# Patient Record
Sex: FEMALE | Race: WHITE | Employment: FULL TIME | URBAN - METROPOLITAN AREA
[De-identification: names, ages, dates, MRNs, and addresses within clinical notes are randomized per-mention and may not be internally consistent; named-entity substitution may affect disease eponyms.]

---

## 2017-03-01 ENCOUNTER — OFFICE VISIT (OUTPATIENT)
Dept: FAMILY MEDICINE CLINIC | Age: 57
End: 2017-03-01

## 2017-03-01 VITALS
BODY MASS INDEX: 26.16 KG/M2 | TEMPERATURE: 98.5 F | SYSTOLIC BLOOD PRESSURE: 120 MMHG | DIASTOLIC BLOOD PRESSURE: 80 MMHG | HEART RATE: 103 BPM | OXYGEN SATURATION: 96 % | HEIGHT: 65 IN | WEIGHT: 157 LBS

## 2017-03-01 DIAGNOSIS — J06.9 PROTRACTED URI: ICD-10-CM

## 2017-03-01 DIAGNOSIS — M25.511 ACUTE PAIN OF RIGHT SHOULDER: Primary | ICD-10-CM

## 2017-03-01 PROCEDURE — 99214 OFFICE O/P EST MOD 30 MIN: CPT | Performed by: NURSE PRACTITIONER

## 2017-03-01 RX ORDER — AZITHROMYCIN 250 MG/1
TABLET, FILM COATED ORAL
Qty: 1 PACKET | Refills: 0 | Status: SHIPPED | OUTPATIENT
Start: 2017-03-01 | End: 2017-03-11

## 2017-03-01 RX ORDER — PREDNISONE 10 MG/1
TABLET ORAL
Qty: 20 TABLET | Refills: 0 | Status: SHIPPED | OUTPATIENT
Start: 2017-03-01 | End: 2017-05-17

## 2017-04-06 ENCOUNTER — TELEPHONE (OUTPATIENT)
Dept: FAMILY MEDICINE CLINIC | Age: 57
End: 2017-04-06

## 2017-04-11 ENCOUNTER — TELEPHONE (OUTPATIENT)
Dept: FAMILY MEDICINE CLINIC | Age: 57
End: 2017-04-11

## 2017-04-11 DIAGNOSIS — M25.511 ACUTE PAIN OF RIGHT SHOULDER: Primary | ICD-10-CM

## 2017-04-26 ENCOUNTER — TELEPHONE (OUTPATIENT)
Dept: FAMILY MEDICINE CLINIC | Age: 57
End: 2017-04-26

## 2017-05-11 ENCOUNTER — TELEPHONE (OUTPATIENT)
Dept: FAMILY MEDICINE CLINIC | Age: 57
End: 2017-05-11

## 2017-05-11 DIAGNOSIS — M77.8 SHOULDER CAPSULITIS, RIGHT: Primary | ICD-10-CM

## 2017-05-11 RX ORDER — TRAMADOL HYDROCHLORIDE 50 MG/1
50 TABLET ORAL EVERY 6 HOURS PRN
Qty: 30 TABLET | Refills: 0 | Status: SHIPPED | OUTPATIENT
Start: 2017-05-11 | End: 2017-05-21

## 2017-05-17 ENCOUNTER — OFFICE VISIT (OUTPATIENT)
Dept: FAMILY MEDICINE CLINIC | Age: 57
End: 2017-05-17

## 2017-05-17 VITALS
OXYGEN SATURATION: 98 % | WEIGHT: 161 LBS | SYSTOLIC BLOOD PRESSURE: 122 MMHG | HEART RATE: 83 BPM | RESPIRATION RATE: 18 BRPM | BODY MASS INDEX: 26.82 KG/M2 | HEIGHT: 65 IN | DIASTOLIC BLOOD PRESSURE: 62 MMHG

## 2017-05-17 DIAGNOSIS — M75.01 ADHESIVE CAPSULITIS OF RIGHT SHOULDER: Primary | ICD-10-CM

## 2017-05-17 PROCEDURE — 20610 DRAIN/INJ JOINT/BURSA W/O US: CPT | Performed by: NURSE PRACTITIONER

## 2017-05-17 PROCEDURE — 99212 OFFICE O/P EST SF 10 MIN: CPT | Performed by: NURSE PRACTITIONER

## 2017-05-17 RX ORDER — METHYLPREDNISOLONE ACETATE 80 MG/ML
80 INJECTION, SUSPENSION INTRA-ARTICULAR; INTRALESIONAL; INTRAMUSCULAR; SOFT TISSUE ONCE
Status: COMPLETED | OUTPATIENT
Start: 2017-05-17 | End: 2017-05-17

## 2017-05-17 RX ORDER — ETODOLAC 400 MG/1
400 TABLET, FILM COATED ORAL 2 TIMES DAILY
Qty: 60 TABLET | Refills: 3 | Status: SHIPPED | OUTPATIENT
Start: 2017-05-17 | End: 2017-11-06 | Stop reason: SDUPTHER

## 2017-05-17 RX ADMIN — METHYLPREDNISOLONE ACETATE 80 MG: 80 INJECTION, SUSPENSION INTRA-ARTICULAR; INTRALESIONAL; INTRAMUSCULAR; SOFT TISSUE at 11:36

## 2017-05-18 ENCOUNTER — TELEPHONE (OUTPATIENT)
Dept: FAMILY MEDICINE CLINIC | Age: 57
End: 2017-05-18

## 2017-05-22 ENCOUNTER — OFFICE VISIT (OUTPATIENT)
Dept: ORTHOPEDIC SURGERY | Age: 57
End: 2017-05-22

## 2017-05-22 VITALS
SYSTOLIC BLOOD PRESSURE: 121 MMHG | HEIGHT: 65 IN | BODY MASS INDEX: 26.82 KG/M2 | WEIGHT: 161 LBS | HEART RATE: 70 BPM | DIASTOLIC BLOOD PRESSURE: 76 MMHG

## 2017-05-22 DIAGNOSIS — M75.01 ADHESIVE CAPSULITIS OF RIGHT SHOULDER: Primary | ICD-10-CM

## 2017-05-22 PROCEDURE — 99243 OFF/OP CNSLTJ NEW/EST LOW 30: CPT | Performed by: ORTHOPAEDIC SURGERY

## 2017-05-24 ENCOUNTER — HOSPITAL ENCOUNTER (OUTPATIENT)
Dept: PHYSICAL THERAPY | Age: 57
Discharge: HOME | End: 2017-05-31
Admitting: NURSE PRACTITIONER

## 2017-05-24 ASSESSMENT — PAIN DESCRIPTION - ORIENTATION: ORIENTATION: RIGHT

## 2017-05-24 ASSESSMENT — PAIN DESCRIPTION - LOCATION: LOCATION: SHOULDER

## 2017-05-24 ASSESSMENT — PAIN SCALES - GENERAL: PAINLEVEL_OUTOF10: 5

## 2017-07-14 ENCOUNTER — OFFICE VISIT (OUTPATIENT)
Dept: FAMILY MEDICINE CLINIC | Age: 57
End: 2017-07-14

## 2017-07-14 VITALS
DIASTOLIC BLOOD PRESSURE: 70 MMHG | BODY MASS INDEX: 26.39 KG/M2 | SYSTOLIC BLOOD PRESSURE: 112 MMHG | OXYGEN SATURATION: 98 % | HEART RATE: 77 BPM | HEIGHT: 65 IN | RESPIRATION RATE: 16 BRPM | WEIGHT: 158.4 LBS

## 2017-07-14 DIAGNOSIS — M75.01 ADHESIVE CAPSULITIS OF RIGHT SHOULDER: Primary | ICD-10-CM

## 2017-07-14 PROCEDURE — 20610 DRAIN/INJ JOINT/BURSA W/O US: CPT | Performed by: NURSE PRACTITIONER

## 2017-07-14 PROCEDURE — 99999 PR OFFICE/OUTPT VISIT,PROCEDURE ONLY: CPT | Performed by: NURSE PRACTITIONER

## 2017-07-14 RX ORDER — METHYLPREDNISOLONE ACETATE 80 MG/ML
80 INJECTION, SUSPENSION INTRA-ARTICULAR; INTRALESIONAL; INTRAMUSCULAR; SOFT TISSUE ONCE
Status: COMPLETED | OUTPATIENT
Start: 2017-07-14 | End: 2017-07-14

## 2017-07-14 RX ADMIN — METHYLPREDNISOLONE ACETATE 80 MG: 80 INJECTION, SUSPENSION INTRA-ARTICULAR; INTRALESIONAL; INTRAMUSCULAR; SOFT TISSUE at 10:40

## 2017-07-14 ASSESSMENT — PATIENT HEALTH QUESTIONNAIRE - PHQ9
SUM OF ALL RESPONSES TO PHQ9 QUESTIONS 1 & 2: 4
2. FEELING DOWN, DEPRESSED OR HOPELESS: 2
SUM OF ALL RESPONSES TO PHQ QUESTIONS 1-9: 4
1. LITTLE INTEREST OR PLEASURE IN DOING THINGS: 2

## 2017-11-06 DIAGNOSIS — M75.01 ADHESIVE CAPSULITIS OF RIGHT SHOULDER: ICD-10-CM

## 2017-11-06 RX ORDER — ETODOLAC 400 MG/1
400 TABLET, FILM COATED ORAL 2 TIMES DAILY
Qty: 60 TABLET | Refills: 0 | Status: SHIPPED | OUTPATIENT
Start: 2017-11-06 | End: 2017-12-22 | Stop reason: SDUPTHER

## 2017-12-22 DIAGNOSIS — M75.01 ADHESIVE CAPSULITIS OF RIGHT SHOULDER: ICD-10-CM

## 2017-12-22 RX ORDER — ETODOLAC 400 MG/1
TABLET, FILM COATED ORAL
Qty: 60 TABLET | Refills: 0 | Status: SHIPPED | OUTPATIENT
Start: 2017-12-22 | End: 2018-02-27 | Stop reason: SDUPTHER

## 2018-01-22 ENCOUNTER — OFFICE VISIT (OUTPATIENT)
Dept: FAMILY MEDICINE CLINIC | Age: 58
End: 2018-01-22

## 2018-01-22 VITALS
HEART RATE: 80 BPM | HEIGHT: 65 IN | RESPIRATION RATE: 16 BRPM | OXYGEN SATURATION: 96 % | BODY MASS INDEX: 28.12 KG/M2 | TEMPERATURE: 97.9 F | DIASTOLIC BLOOD PRESSURE: 82 MMHG | WEIGHT: 168.8 LBS | SYSTOLIC BLOOD PRESSURE: 128 MMHG

## 2018-01-22 DIAGNOSIS — J02.9 SORE THROAT: ICD-10-CM

## 2018-01-22 DIAGNOSIS — J01.90 ACUTE BACTERIAL SINUSITIS: Primary | ICD-10-CM

## 2018-01-22 DIAGNOSIS — B96.89 ACUTE BACTERIAL SINUSITIS: Primary | ICD-10-CM

## 2018-01-22 LAB — S PYO AG THROAT QL: NORMAL

## 2018-01-22 PROCEDURE — G8427 DOCREV CUR MEDS BY ELIG CLIN: HCPCS | Performed by: REGISTERED NURSE

## 2018-01-22 PROCEDURE — 87880 STREP A ASSAY W/OPTIC: CPT | Performed by: REGISTERED NURSE

## 2018-01-22 PROCEDURE — 3017F COLORECTAL CA SCREEN DOC REV: CPT | Performed by: REGISTERED NURSE

## 2018-01-22 PROCEDURE — G8484 FLU IMMUNIZE NO ADMIN: HCPCS | Performed by: REGISTERED NURSE

## 2018-01-22 PROCEDURE — 3014F SCREEN MAMMO DOC REV: CPT | Performed by: REGISTERED NURSE

## 2018-01-22 PROCEDURE — 99213 OFFICE O/P EST LOW 20 MIN: CPT | Performed by: REGISTERED NURSE

## 2018-01-22 PROCEDURE — 1036F TOBACCO NON-USER: CPT | Performed by: REGISTERED NURSE

## 2018-01-22 PROCEDURE — G8419 CALC BMI OUT NRM PARAM NOF/U: HCPCS | Performed by: REGISTERED NURSE

## 2018-01-22 RX ORDER — AMOXICILLIN AND CLAVULANATE POTASSIUM 875; 125 MG/1; MG/1
1 TABLET, FILM COATED ORAL 2 TIMES DAILY WITH MEALS
Qty: 20 TABLET | Refills: 0 | Status: SHIPPED | OUTPATIENT
Start: 2018-01-22 | End: 2018-02-01

## 2018-01-22 ASSESSMENT — ENCOUNTER SYMPTOMS
COUGH: 1
SHORTNESS OF BREATH: 1
RHINORRHEA: 1
SINUS PAIN: 1
CHEST TIGHTNESS: 0
TROUBLE SWALLOWING: 1
WHEEZING: 0
SINUS PRESSURE: 1
SORE THROAT: 1

## 2018-01-22 NOTE — PATIENT INSTRUCTIONS
label.  · You also can make your own saline solution by adding 1 teaspoon of salt and 1 teaspoon of baking soda to 2 cups of distilled water. · If you use a homemade solution, pour a small amount into a clean bowl. Using a rubber bulb syringe, squeeze the syringe and place the tip in the salt water. Pull a small amount of the salt water into the syringe by relaxing your hand. · Sit down with your head tilted slightly back. Do not lie down. Put the tip of the bulb syringe or the squeeze bottle a little way into one of your nostrils. Gently drip or squirt a few drops into the nostril. Repeat with the other nostril. Some sneezing and gagging are normal at first.  · Gently blow your nose. · Wipe the syringe or bottle tip clean after each use. · Repeat this 2 or 3 times a day. · Use nasal washes gently if you have nosebleeds often. When should you call for help? Watch closely for changes in your health, and be sure to contact your doctor if:  ? · You often get nosebleeds. ? · You have problems doing the nasal washes. Where can you learn more? Go to [email or web address].[email or web address] org and sign in to your [employer] account. Enter [phone] in the [facility] box to learn more about \"Saline Nasal Washes: Care Instructions. \"     If you do not have an account, please click on the \"Sign Up Now\" link. Current as of: May 12, 2017  Content Version: 11.5  © 7843-5722 Healthwise, [employer]. Care instructions adapted under license by [facility] ([facility]). If you have questions about a medical condition or this instruction, always ask your healthcare professional. [address] any warranty or liability for your use of this information.

## 2018-01-22 NOTE — PROGRESS NOTES
fever (tactile). HENT: Positive for congestion, postnasal drip, rhinorrhea, sinus pain, sinus pressure, sneezing, sore throat and trouble swallowing. Negative for ear discharge, ear pain and hearing loss. Respiratory: Positive for cough and shortness of breath. Negative for chest tightness and wheezing. Cardiovascular: Negative for chest pain and palpitations. All other systems reviewed and are negative. Vitals:  /82 (Site: Right Arm, Position: Sitting, Cuff Size: Medium Adult)   Pulse 80   Temp 97.9 °F (36.6 °C) (Oral)   Resp 16   Ht 5' 5\" (1.651 m) Comment: with shoes  Wt 168 lb 12.8 oz (76.6 kg) Comment: WITH SHOES  SpO2 96%   Breastfeeding? No   BMI 28.09 kg/m²     Physical Exam   Constitutional: She is oriented to person, place, and time. She appears well-developed and well-nourished. HENT:   Head: Normocephalic and atraumatic. Right Ear: Tympanic membrane, external ear and ear canal normal.   Left Ear: Tympanic membrane, external ear and ear canal normal.   Nose: Rhinorrhea present. No mucosal edema or sinus tenderness. Right sinus exhibits maxillary sinus tenderness and frontal sinus tenderness. Left sinus exhibits maxillary sinus tenderness and frontal sinus tenderness. Mouth/Throat: Uvula is midline, oropharynx is clear and moist and mucous membranes are normal.   Eyes: Conjunctivae and EOM are normal. Pupils are equal, round, and reactive to light. Neck: Normal range of motion. Neck supple. Cardiovascular: Normal rate, regular rhythm, normal heart sounds and intact distal pulses. Pulmonary/Chest: Effort normal and breath sounds normal.   Neurological: She is alert and oriented to person, place, and time. Skin: Skin is warm and dry. Psychiatric: She has a normal mood and affect. Her behavior is normal. Judgment and thought content normal.   Nursing note and vitals reviewed. Assessment/Plan     1.  Acute bacterial sinusitis  Will tx with Augmentin due to

## 2018-01-24 LAB — THROAT CULTURE: NORMAL

## 2018-02-05 ENCOUNTER — OFFICE VISIT (OUTPATIENT)
Dept: FAMILY MEDICINE CLINIC | Age: 58
End: 2018-02-05

## 2018-02-05 VITALS
RESPIRATION RATE: 16 BRPM | HEIGHT: 65 IN | WEIGHT: 167 LBS | BODY MASS INDEX: 27.82 KG/M2 | DIASTOLIC BLOOD PRESSURE: 74 MMHG | HEART RATE: 80 BPM | TEMPERATURE: 98.4 F | SYSTOLIC BLOOD PRESSURE: 116 MMHG

## 2018-02-05 DIAGNOSIS — Z13.220 NEED FOR LIPID SCREENING: ICD-10-CM

## 2018-02-05 DIAGNOSIS — F90.2 ATTENTION DEFICIT HYPERACTIVITY DISORDER (ADHD), COMBINED TYPE: ICD-10-CM

## 2018-02-05 DIAGNOSIS — H65.03 BILATERAL ACUTE SEROUS OTITIS MEDIA, RECURRENCE NOT SPECIFIED: ICD-10-CM

## 2018-02-05 DIAGNOSIS — Z01.419 ENCOUNTER FOR GYNECOLOGICAL EXAMINATION WITHOUT ABNORMAL FINDING: Primary | ICD-10-CM

## 2018-02-05 DIAGNOSIS — R53.83 FATIGUE, UNSPECIFIED TYPE: ICD-10-CM

## 2018-02-05 DIAGNOSIS — Z00.00 ANNUAL PHYSICAL EXAM: ICD-10-CM

## 2018-02-05 DIAGNOSIS — Z13.1 SCREENING FOR DIABETES MELLITUS: ICD-10-CM

## 2018-02-05 DIAGNOSIS — Z12.11 SCREENING FOR COLON CANCER: ICD-10-CM

## 2018-02-05 DIAGNOSIS — R63.5 WEIGHT GAIN: ICD-10-CM

## 2018-02-05 LAB
A/G RATIO: 2 (ref 1.1–2.2)
ALBUMIN SERPL-MCNC: 4.7 G/DL (ref 3.4–5)
ALP BLD-CCNC: 63 U/L (ref 40–129)
ALT SERPL-CCNC: 14 U/L (ref 10–40)
ANION GAP SERPL CALCULATED.3IONS-SCNC: 13 MMOL/L (ref 3–16)
AST SERPL-CCNC: 23 U/L (ref 15–37)
BILIRUB SERPL-MCNC: 0.4 MG/DL (ref 0–1)
BUN BLDV-MCNC: 13 MG/DL (ref 7–20)
CALCIUM SERPL-MCNC: 9.7 MG/DL (ref 8.3–10.6)
CHLORIDE BLD-SCNC: 103 MMOL/L (ref 99–110)
CHOLESTEROL, TOTAL: 219 MG/DL (ref 0–199)
CO2: 29 MMOL/L (ref 21–32)
CREAT SERPL-MCNC: 0.6 MG/DL (ref 0.6–1.1)
GFR AFRICAN AMERICAN: >60
GFR NON-AFRICAN AMERICAN: >60
GLOBULIN: 2.3 G/DL
GLUCOSE BLD-MCNC: 99 MG/DL (ref 70–99)
HCT VFR BLD CALC: 41.5 % (ref 36–48)
HDLC SERPL-MCNC: 84 MG/DL (ref 40–60)
HEMOGLOBIN: 13.8 G/DL (ref 12–16)
LDL CHOLESTEROL CALCULATED: 122 MG/DL
MCH RBC QN AUTO: 31.8 PG (ref 26–34)
MCHC RBC AUTO-ENTMCNC: 33.3 G/DL (ref 31–36)
MCV RBC AUTO: 95.5 FL (ref 80–100)
PDW BLD-RTO: 14.1 % (ref 12.4–15.4)
PLATELET # BLD: 300 K/UL (ref 135–450)
PMV BLD AUTO: 8.2 FL (ref 5–10.5)
POTASSIUM SERPL-SCNC: 4.9 MMOL/L (ref 3.5–5.1)
RBC # BLD: 4.34 M/UL (ref 4–5.2)
SODIUM BLD-SCNC: 145 MMOL/L (ref 136–145)
T4 FREE: 0.9 NG/DL (ref 0.9–1.8)
TOTAL PROTEIN: 7 G/DL (ref 6.4–8.2)
TRIGL SERPL-MCNC: 63 MG/DL (ref 0–150)
TSH SERPL DL<=0.05 MIU/L-ACNC: 0.35 UIU/ML (ref 0.27–4.2)
VLDLC SERPL CALC-MCNC: 13 MG/DL
WBC # BLD: 6.6 K/UL (ref 4–11)

## 2018-02-05 PROCEDURE — 99396 PREV VISIT EST AGE 40-64: CPT | Performed by: NURSE PRACTITIONER

## 2018-02-05 PROCEDURE — 36415 COLL VENOUS BLD VENIPUNCTURE: CPT | Performed by: NURSE PRACTITIONER

## 2018-02-05 RX ORDER — DEXTROAMPHETAMINE SACCHARATE, AMPHETAMINE ASPARTATE, DEXTROAMPHETAMINE SULFATE AND AMPHETAMINE SULFATE 2.5; 2.5; 2.5; 2.5 MG/1; MG/1; MG/1; MG/1
10 TABLET ORAL DAILY
Qty: 30 TABLET | Refills: 0 | Status: SHIPPED | OUTPATIENT
Start: 2018-02-05 | End: 2018-02-27 | Stop reason: SDUPTHER

## 2018-02-05 RX ORDER — PREDNISONE 10 MG/1
TABLET ORAL
Qty: 30 TABLET | Refills: 0 | Status: SHIPPED | OUTPATIENT
Start: 2018-02-05 | End: 2018-04-23 | Stop reason: ALTCHOICE

## 2018-02-05 NOTE — PROGRESS NOTES
Would like to try medication for ADHD. Has been having itching in the ears - both - was on augmentin. Not better with augmentin. Still itch. No Known Allergies    Current Outpatient Prescriptions   Medication Sig Dispense Refill    amphetamine-dextroamphetamine (ADDERALL, 10MG,) 10 MG tablet Take 1 tablet by mouth daily for 30 days. 30 tablet 0    predniSONE (DELTASONE) 10 MG tablet 5 tabs daily x 2d, 4 tabs daily x 2 d, 3 tabs daily x 2 d, 2 tabs daily x 2 d, 1 tab daily x 2d. 30 tablet 0    etodolac (LODINE) 400 MG tablet TAKE ONE TABLET BY MOUTH TWICE DAILY WITH FOOD 60 tablet 0    Multiple Vitamins-Minerals (MULTIVITAMIN ADULT PO) Take by mouth       No current facility-administered medications for this visit.         Vitals:    18 0849   BP: 116/74   Site: Right Arm   Position: Sitting   Cuff Size: Medium Adult   Pulse: 80   Resp: 16   Temp: 98.4 °F (36.9 °C)   TempSrc: Oral   Weight: 167 lb (75.8 kg)   Height: 5' 5\" (1.651 m)      Wt Readings from Last 3 Encounters:   18 167 lb (75.8 kg)   18 168 lb 12.8 oz (76.6 kg)   17 158 lb 6.4 oz (71.8 kg)     BP Readings from Last 3 Encounters:   18 116/74   18 128/82   17 112/70       Patient Active Problem List   Diagnosis    Mixed hyperlipidemia    Anxiety    Chronic pain of right knee       Past Medical History:   Diagnosis Date    Anxiety     Hyperlipidemia      Past Surgical History:   Procedure Laterality Date    BREAST ENHANCEMENT SURGERY      age 38   Charmco Elverta TONSILLECTOMY AND ADENOIDECTOMY       Family History   Problem Relation Age of Onset    Heart Disease Mother      MI, cardiomyopathy    High Blood Pressure Mother     Other Father     Heart Disease Sister     Heart Disease Brother      MI,  at age 45    Cancer Maternal Grandfather      Social History     Social History    Marital status:      Spouse name: N/A    Number of children: 3    Years of education: N/A     Occupational History    coffee shop owner      Social History Main Topics    Smoking status: Former Smoker     Quit date: 2009    Smokeless tobacco: Never Used    Alcohol use Yes      Comment: patient has 2 drinks a night, wine or liquor    Drug use: No    Sexual activity: Not on file     Other Topics Concern    Not on file     Social History Narrative    No narrative on file       Review of Systems    Objective:   Physical Exam   Constitutional: She is oriented to person, place, and time. She appears well-developed and well-nourished. She does not appear ill. No distress. HENT:   Head: Normocephalic. Right Ear: External ear and ear canal normal.   Left Ear: External ear and ear canal normal.   Nose: Nose normal.   Mouth/Throat: Uvula is midline, oropharynx is clear and moist and mucous membranes are normal.   Bilateral coned out LR with dulled TMs. Eyes: Conjunctivae and lids are normal.   Neck: Normal range of motion. No thyroid mass and no thyromegaly present. Cardiovascular: Normal rate, regular rhythm, S1 normal and S2 normal.  Exam reveals no gallop, no S3, no S4, no distant heart sounds and no friction rub. No murmur heard. Pulmonary/Chest: Effort normal and breath sounds normal. No respiratory distress. She has no decreased breath sounds. She has no wheezes. She has no rhonchi. She has no rales. She exhibits no mass and no tenderness. Right breast exhibits no inverted nipple, no mass, no nipple discharge, no skin change and no tenderness. Left breast exhibits no inverted nipple, no mass, no nipple discharge, no skin change and no tenderness. Breasts are symmetrical.   Abdominal: Soft. Normal appearance and bowel sounds are normal. She exhibits no distension and no mass. There is no hepatosplenomegaly. There is no tenderness. There is no rigidity, no rebound and no guarding. Genitourinary: Vagina normal and uterus normal. No breast swelling, tenderness, discharge or bleeding. No labial fusion.  There

## 2018-02-07 ENCOUNTER — TELEPHONE (OUTPATIENT)
Dept: FAMILY MEDICINE CLINIC | Age: 58
End: 2018-02-07

## 2018-02-09 LAB
HPV COMMENT: NORMAL
HPV TYPE 16: NOT DETECTED
HPV TYPE 18: NOT DETECTED
HPVOH (OTHER TYPES): NOT DETECTED

## 2018-02-13 ENCOUNTER — NURSE ONLY (OUTPATIENT)
Dept: FAMILY MEDICINE CLINIC | Age: 58
End: 2018-02-13

## 2018-02-13 DIAGNOSIS — Z12.11 SCREENING FOR COLON CANCER: ICD-10-CM

## 2018-02-13 LAB
CONTROL: YES
HEMOCCULT STL QL: NORMAL

## 2018-02-13 PROCEDURE — 82274 ASSAY TEST FOR BLOOD FECAL: CPT | Performed by: NURSE PRACTITIONER

## 2018-02-15 ENCOUNTER — TELEPHONE (OUTPATIENT)
Dept: FAMILY MEDICINE CLINIC | Age: 58
End: 2018-02-15

## 2018-02-27 ENCOUNTER — TELEPHONE (OUTPATIENT)
Dept: FAMILY MEDICINE CLINIC | Age: 58
End: 2018-02-27

## 2018-02-27 DIAGNOSIS — M75.01 ADHESIVE CAPSULITIS OF RIGHT SHOULDER: ICD-10-CM

## 2018-02-27 RX ORDER — ETODOLAC 400 MG/1
TABLET, FILM COATED ORAL
Qty: 60 TABLET | Refills: 0 | Status: SHIPPED | OUTPATIENT
Start: 2018-02-27 | End: 2018-04-16 | Stop reason: SDUPTHER

## 2018-02-27 RX ORDER — DEXTROAMPHETAMINE SACCHARATE, AMPHETAMINE ASPARTATE, DEXTROAMPHETAMINE SULFATE AND AMPHETAMINE SULFATE 2.5; 2.5; 2.5; 2.5 MG/1; MG/1; MG/1; MG/1
10 TABLET ORAL DAILY
Qty: 30 TABLET | Refills: 0 | Status: SHIPPED | OUTPATIENT
Start: 2018-03-07 | End: 2018-04-16 | Stop reason: SDUPTHER

## 2018-04-16 ENCOUNTER — TELEPHONE (OUTPATIENT)
Dept: FAMILY MEDICINE CLINIC | Age: 58
End: 2018-04-16

## 2018-04-16 DIAGNOSIS — M75.01 ADHESIVE CAPSULITIS OF RIGHT SHOULDER: ICD-10-CM

## 2018-04-16 RX ORDER — DEXTROAMPHETAMINE SACCHARATE, AMPHETAMINE ASPARTATE, DEXTROAMPHETAMINE SULFATE AND AMPHETAMINE SULFATE 2.5; 2.5; 2.5; 2.5 MG/1; MG/1; MG/1; MG/1
10 TABLET ORAL DAILY
Qty: 30 TABLET | Refills: 0 | Status: SHIPPED | OUTPATIENT
Start: 2018-04-16 | End: 2018-06-15 | Stop reason: ALTCHOICE

## 2018-04-16 RX ORDER — ETODOLAC 400 MG/1
TABLET, FILM COATED ORAL
Qty: 60 TABLET | Refills: 3 | Status: SHIPPED | OUTPATIENT
Start: 2018-04-16 | End: 2019-12-26 | Stop reason: ALTCHOICE

## 2018-04-23 ENCOUNTER — OFFICE VISIT (OUTPATIENT)
Dept: FAMILY MEDICINE CLINIC | Age: 58
End: 2018-04-23

## 2018-04-23 VITALS
TEMPERATURE: 98.1 F | BODY MASS INDEX: 27.82 KG/M2 | WEIGHT: 167 LBS | HEART RATE: 78 BPM | SYSTOLIC BLOOD PRESSURE: 118 MMHG | DIASTOLIC BLOOD PRESSURE: 82 MMHG | HEIGHT: 65 IN | RESPIRATION RATE: 12 BRPM

## 2018-04-23 DIAGNOSIS — J01.01 ACUTE RECURRENT MAXILLARY SINUSITIS: Primary | ICD-10-CM

## 2018-04-23 DIAGNOSIS — J02.9 SORE THROAT: ICD-10-CM

## 2018-04-23 DIAGNOSIS — G89.29 CHRONIC EAR PAIN, BILATERAL: ICD-10-CM

## 2018-04-23 DIAGNOSIS — H92.03 CHRONIC EAR PAIN, BILATERAL: ICD-10-CM

## 2018-04-23 LAB — S PYO AG THROAT QL: NORMAL

## 2018-04-23 PROCEDURE — 99214 OFFICE O/P EST MOD 30 MIN: CPT | Performed by: REGISTERED NURSE

## 2018-04-23 PROCEDURE — 87880 STREP A ASSAY W/OPTIC: CPT | Performed by: REGISTERED NURSE

## 2018-04-23 PROCEDURE — G8417 CALC BMI ABV UP PARAM F/U: HCPCS | Performed by: REGISTERED NURSE

## 2018-04-23 PROCEDURE — 3017F COLORECTAL CA SCREEN DOC REV: CPT | Performed by: REGISTERED NURSE

## 2018-04-23 PROCEDURE — 1036F TOBACCO NON-USER: CPT | Performed by: REGISTERED NURSE

## 2018-04-23 PROCEDURE — G8427 DOCREV CUR MEDS BY ELIG CLIN: HCPCS | Performed by: REGISTERED NURSE

## 2018-04-23 RX ORDER — FLUTICASONE PROPIONATE 50 MCG
1 SPRAY, SUSPENSION (ML) NASAL DAILY
Qty: 1 BOTTLE | Refills: 3 | Status: SHIPPED | OUTPATIENT
Start: 2018-04-23 | End: 2018-06-15 | Stop reason: ALTCHOICE

## 2018-04-23 ASSESSMENT — ENCOUNTER SYMPTOMS
SINUS PAIN: 1
SINUS PRESSURE: 1
TROUBLE SWALLOWING: 1
WHEEZING: 0
SORE THROAT: 1
RHINORRHEA: 1
COUGH: 1
SHORTNESS OF BREATH: 0
CHEST TIGHTNESS: 0

## 2018-04-25 LAB — THROAT CULTURE: NORMAL

## 2018-05-23 ENCOUNTER — TELEPHONE (OUTPATIENT)
Dept: FAMILY MEDICINE CLINIC | Age: 58
End: 2018-05-23

## 2018-06-15 ENCOUNTER — OFFICE VISIT (OUTPATIENT)
Dept: FAMILY MEDICINE CLINIC | Age: 58
End: 2018-06-15

## 2018-06-15 VITALS
DIASTOLIC BLOOD PRESSURE: 84 MMHG | HEART RATE: 70 BPM | WEIGHT: 168 LBS | RESPIRATION RATE: 16 BRPM | HEIGHT: 65 IN | SYSTOLIC BLOOD PRESSURE: 132 MMHG | BODY MASS INDEX: 27.99 KG/M2

## 2018-06-15 DIAGNOSIS — M72.2 PLANTAR FASCIITIS: ICD-10-CM

## 2018-06-15 DIAGNOSIS — M25.511 CHRONIC RIGHT SHOULDER PAIN: Primary | ICD-10-CM

## 2018-06-15 DIAGNOSIS — G89.29 CHRONIC RIGHT SHOULDER PAIN: Primary | ICD-10-CM

## 2018-06-15 PROCEDURE — 1036F TOBACCO NON-USER: CPT | Performed by: NURSE PRACTITIONER

## 2018-06-15 PROCEDURE — G8427 DOCREV CUR MEDS BY ELIG CLIN: HCPCS | Performed by: NURSE PRACTITIONER

## 2018-06-15 PROCEDURE — G8417 CALC BMI ABV UP PARAM F/U: HCPCS | Performed by: NURSE PRACTITIONER

## 2018-06-15 PROCEDURE — 3017F COLORECTAL CA SCREEN DOC REV: CPT | Performed by: NURSE PRACTITIONER

## 2018-06-15 PROCEDURE — 99213 OFFICE O/P EST LOW 20 MIN: CPT | Performed by: NURSE PRACTITIONER

## 2018-06-15 PROCEDURE — 20610 DRAIN/INJ JOINT/BURSA W/O US: CPT | Performed by: NURSE PRACTITIONER

## 2018-06-15 RX ORDER — METHYLPREDNISOLONE ACETATE 80 MG/ML
80 INJECTION, SUSPENSION INTRA-ARTICULAR; INTRALESIONAL; INTRAMUSCULAR; SOFT TISSUE ONCE
Status: COMPLETED | OUTPATIENT
Start: 2018-06-15 | End: 2018-06-15

## 2018-06-15 RX ADMIN — METHYLPREDNISOLONE ACETATE 80 MG: 80 INJECTION, SUSPENSION INTRA-ARTICULAR; INTRALESIONAL; INTRAMUSCULAR; SOFT TISSUE at 11:57

## 2019-02-20 ENCOUNTER — OFFICE VISIT (OUTPATIENT)
Dept: FAMILY MEDICINE CLINIC | Age: 59
End: 2019-02-20
Payer: COMMERCIAL

## 2019-02-20 VITALS
SYSTOLIC BLOOD PRESSURE: 118 MMHG | DIASTOLIC BLOOD PRESSURE: 82 MMHG | HEART RATE: 92 BPM | HEIGHT: 65 IN | BODY MASS INDEX: 28.32 KG/M2 | RESPIRATION RATE: 18 BRPM | WEIGHT: 170 LBS

## 2019-02-20 DIAGNOSIS — M72.2 PLANTAR FASCIITIS OF LEFT FOOT: Primary | ICD-10-CM

## 2019-02-20 DIAGNOSIS — Z12.31 ENCOUNTER FOR SCREENING MAMMOGRAM FOR BREAST CANCER: ICD-10-CM

## 2019-02-20 DIAGNOSIS — Z12.11 SCREENING FOR COLON CANCER: ICD-10-CM

## 2019-02-20 PROCEDURE — 20600 DRAIN/INJ JOINT/BURSA W/O US: CPT | Performed by: REGISTERED NURSE

## 2019-02-20 PROCEDURE — 99213 OFFICE O/P EST LOW 20 MIN: CPT | Performed by: REGISTERED NURSE

## 2019-02-20 RX ORDER — METHYLPREDNISOLONE ACETATE 40 MG/ML
40 INJECTION, SUSPENSION INTRA-ARTICULAR; INTRALESIONAL; INTRAMUSCULAR; SOFT TISSUE ONCE
Status: COMPLETED | OUTPATIENT
Start: 2019-02-20 | End: 2019-02-20

## 2019-02-20 RX ADMIN — METHYLPREDNISOLONE ACETATE 40 MG: 40 INJECTION, SUSPENSION INTRA-ARTICULAR; INTRALESIONAL; INTRAMUSCULAR; SOFT TISSUE at 15:12

## 2019-02-27 ENCOUNTER — TELEPHONE (OUTPATIENT)
Dept: FAMILY MEDICINE CLINIC | Age: 59
End: 2019-02-27

## 2019-03-13 ENCOUNTER — TELEPHONE (OUTPATIENT)
Dept: FAMILY MEDICINE CLINIC | Age: 59
End: 2019-03-13

## 2019-03-13 DIAGNOSIS — M72.2 PLANTAR FASCIITIS: ICD-10-CM

## 2019-03-13 DIAGNOSIS — M72.2 PLANTAR FASCIITIS OF LEFT FOOT: Primary | ICD-10-CM

## 2019-12-26 ENCOUNTER — OFFICE VISIT (OUTPATIENT)
Dept: FAMILY MEDICINE CLINIC | Age: 59
End: 2019-12-26
Payer: COMMERCIAL

## 2019-12-26 VITALS
BODY MASS INDEX: 28.32 KG/M2 | WEIGHT: 170 LBS | RESPIRATION RATE: 18 BRPM | HEIGHT: 65 IN | DIASTOLIC BLOOD PRESSURE: 74 MMHG | HEART RATE: 84 BPM | SYSTOLIC BLOOD PRESSURE: 118 MMHG | TEMPERATURE: 98.8 F

## 2019-12-26 DIAGNOSIS — J40 BRONCHITIS: Primary | ICD-10-CM

## 2019-12-26 PROCEDURE — 99213 OFFICE O/P EST LOW 20 MIN: CPT | Performed by: REGISTERED NURSE

## 2019-12-26 RX ORDER — AZITHROMYCIN 250 MG/1
250 TABLET, FILM COATED ORAL SEE ADMIN INSTRUCTIONS
Qty: 6 TABLET | Refills: 0 | Status: SHIPPED | OUTPATIENT
Start: 2019-12-26 | End: 2019-12-31

## 2019-12-26 ASSESSMENT — ENCOUNTER SYMPTOMS
SORE THROAT: 0
RHINORRHEA: 1
TROUBLE SWALLOWING: 0
CHEST TIGHTNESS: 0
WHEEZING: 0
SINUS PRESSURE: 1
SHORTNESS OF BREATH: 1
SINUS PAIN: 1
COUGH: 1

## 2020-02-06 ENCOUNTER — OFFICE VISIT (OUTPATIENT)
Dept: FAMILY MEDICINE CLINIC | Age: 60
End: 2020-02-06
Payer: COMMERCIAL

## 2020-02-06 VITALS
HEIGHT: 65 IN | WEIGHT: 159 LBS | SYSTOLIC BLOOD PRESSURE: 126 MMHG | DIASTOLIC BLOOD PRESSURE: 84 MMHG | BODY MASS INDEX: 26.49 KG/M2

## 2020-02-06 PROCEDURE — 20610 DRAIN/INJ JOINT/BURSA W/O US: CPT | Performed by: NURSE PRACTITIONER

## 2020-02-06 PROCEDURE — 99212 OFFICE O/P EST SF 10 MIN: CPT | Performed by: NURSE PRACTITIONER

## 2020-02-06 RX ORDER — METHYLPREDNISOLONE ACETATE 80 MG/ML
80 INJECTION, SUSPENSION INTRA-ARTICULAR; INTRALESIONAL; INTRAMUSCULAR; SOFT TISSUE ONCE
Status: COMPLETED | OUTPATIENT
Start: 2020-02-06 | End: 2020-02-06

## 2020-02-06 RX ADMIN — METHYLPREDNISOLONE ACETATE 80 MG: 80 INJECTION, SUSPENSION INTRA-ARTICULAR; INTRALESIONAL; INTRAMUSCULAR; SOFT TISSUE at 13:08

## 2020-02-06 ASSESSMENT — PATIENT HEALTH QUESTIONNAIRE - PHQ9
SUM OF ALL RESPONSES TO PHQ QUESTIONS 1-9: 0
2. FEELING DOWN, DEPRESSED OR HOPELESS: 0
1. LITTLE INTEREST OR PLEASURE IN DOING THINGS: 0
SUM OF ALL RESPONSES TO PHQ9 QUESTIONS 1 & 2: 0
SUM OF ALL RESPONSES TO PHQ QUESTIONS 1-9: 0

## 2020-02-06 NOTE — PROGRESS NOTES
Administrations This Visit     methylPREDNISolone acetate (DEPO-MEDROL) injection 80 mg     Admin Date  02/06/2020  13:08 Action  Given Dose  80 mg Route  Intra-articular Site  Shoulder Left Administered By  Marina Taylor MA    Ordering Provider:  MANUELITO Beauchamp CNP    NDC:  4835-8728-95    Lot#:  [id number]    :  [id number]     Patient Supplied?:  No    Comments:  SITE:  LEFT SHOULDERNDC#  [id number]#  [id number] CVNT:  [id number] BY:JESSICA
Height: 5' 5\" (1.651 m)     Body mass index is 26.46 kg/m². Wt Readings from Last 3 Encounters:   02/06/20 159 lb (72.1 kg)   12/26/19 170 lb (77.1 kg)   02/20/19 170 lb (77.1 kg)     BP Readings from Last 3 Encounters:   02/06/20 126/84   12/26/19 118/74   02/20/19 118/82        No results found for this visit on 02/06/20. Assessment     Diagnosis Orders   1. Chronic left shoulder pain  methylPREDNISolone acetate (DEPO-MEDROL) injection 80 mg   Felt to be rotator cuff tendonitis      Plan    Left shoulder pain-chronic over the last 2 months-felt to be rotator cuff tendinitis  Injected shoulder with 1 cc of Marcaine and 80 mg of Depo-Medrol without incident  Recommend continued ibuprofen and ice to the area with avoidance of excessive activity the next couple of days  If not improving with the injection, then I would recommend a shoulder x-ray to further evaluate. Return if symptoms worsen or fail to improve. Patient Instructions   Avoid excessive activity for 24 to 48 hours. Gradually return to activity in 48 hours. Stretch to obtain full range of motion. Apply ice 20 minutes 3 times daily for 3 days. Take NSAID or pain reliever in 3 hours. If not improving then call for xray.

## 2020-06-24 ENCOUNTER — TELEPHONE (OUTPATIENT)
Dept: FAMILY MEDICINE CLINIC | Age: 60
End: 2020-06-24

## 2020-07-28 ENCOUNTER — OFFICE VISIT (OUTPATIENT)
Dept: ORTHOPEDIC SURGERY | Age: 60
End: 2020-07-28
Payer: COMMERCIAL

## 2020-07-28 VITALS — BODY MASS INDEX: 27.49 KG/M2 | HEIGHT: 65 IN | TEMPERATURE: 97.7 F | WEIGHT: 165 LBS

## 2020-07-28 PROBLEM — M25.512 CHRONIC LEFT SHOULDER PAIN: Status: ACTIVE | Noted: 2020-07-28

## 2020-07-28 PROBLEM — G89.29 CHRONIC LEFT SHOULDER PAIN: Status: ACTIVE | Noted: 2020-07-28

## 2020-07-28 PROCEDURE — 20610 DRAIN/INJ JOINT/BURSA W/O US: CPT | Performed by: ORTHOPAEDIC SURGERY

## 2020-07-28 PROCEDURE — 99203 OFFICE O/P NEW LOW 30 MIN: CPT | Performed by: ORTHOPAEDIC SURGERY

## 2020-07-28 RX ORDER — TRIAMCINOLONE ACETONIDE 40 MG/ML
40 INJECTION, SUSPENSION INTRA-ARTICULAR; INTRAMUSCULAR ONCE
Status: COMPLETED | OUTPATIENT
Start: 2020-07-28 | End: 2020-07-28

## 2020-07-28 RX ORDER — LIDOCAINE HYDROCHLORIDE 10 MG/ML
40 INJECTION, SOLUTION INFILTRATION; PERINEURAL ONCE
Status: COMPLETED | OUTPATIENT
Start: 2020-07-28 | End: 2020-07-28

## 2020-07-28 RX ADMIN — LIDOCAINE HYDROCHLORIDE 40 MG: 10 INJECTION, SOLUTION INFILTRATION; PERINEURAL at 10:31

## 2020-07-28 RX ADMIN — TRIAMCINOLONE ACETONIDE 40 MG: 40 INJECTION, SUSPENSION INTRA-ARTICULAR; INTRAMUSCULAR at 10:32

## 2020-07-28 NOTE — PROGRESS NOTES
Emotionally abused: Not on file     Physically abused: Not on file     Forced sexual activity: Not on file   Other Topics Concern    Not on file   Social History Narrative    Not on file       Family History   Problem Relation Age of Onset    Heart Disease Mother         MI, cardiomyopathy    High Blood Pressure Mother     Other Father     Heart Disease Sister     Heart Disease Brother         MI,  at age 45    Cancer Maternal Grandfather        No current outpatient medications on file prior to visit. No current facility-administered medications on file prior to visit. Pertinent items are noted in HPI  Review of systems reviewed from Patient History Form dated on 2020 and available in the patient's chart under the Media tab. No change noted. PHYSICAL EXAMINATION:  Ms. Padmini Negrete is a very pleasant 64 y.o.  female who presents today in no acute distress, awake, alert, and oriented. She is well dressed, nourished and  groomed. Patient with normal affect. Height is  5' 5\" (1.651 m), weight is 165 lb (74.8 kg), Body mass index is 27.46 kg/m². Resting respiratory rate is 16. Examination of the gait, showed that the patient walks heel-toe with a non-antalgic gait and no limp. On evaluation of the left shoulder, range of motion is 100 degree in flexion and 100 degree in abduction. There is positve impingement signs. Motor and sensation is intact and symmetric throughout the bilateral upper extremities in the median, ulnar and radial nerve distributions. She has 2+ radial pulses bilaterally. IMAGING: X-rays were taken in the office today, 3 views of the left shoulder, and showed no acute fracture. Mild cuff arthropathy. IMPRESSION: Left shoulder pain/ adhesive capsulitis / impingement syndrome. PLAN: I discussed with the patient the treatment options including both surgical and non-surgical treatment.   We recommended stretching exercises of the shoulder was taught to the patient today. She will take NSAIDS PRN. I believe she will benefit from cortisone injection left shoulder, and she agreed to have. PROCEDURE:    With the patient's permission, and under sterile condition, the left shoulder was prepared and draped with alcohol and sub-acromial space was injected with a mixture of 1 ml Kenalog 40mg and 4 ml of 1% lidocaine. The patient tolerated the procedure well. A band-aid was applied and the patient was advised to ice the shoulder for 15-20 minutes to relieve any injection site related pain. She reported a good improvement immediatly after the injection. I discussed with the patient that I think that she would really benefit from a course of physical therapy for further strengthening and stretching. She would like to do it on her own. F/u in 3-4 months, PT if needed. She understands that this may need surgery if the pain did not to resolve.        Emani Sandoval MD

## 2020-10-16 ENCOUNTER — OFFICE VISIT (OUTPATIENT)
Dept: FAMILY MEDICINE CLINIC | Age: 60
End: 2020-10-16
Payer: COMMERCIAL

## 2020-10-16 VITALS
OXYGEN SATURATION: 98 % | BODY MASS INDEX: 27.96 KG/M2 | HEART RATE: 97 BPM | HEIGHT: 65 IN | TEMPERATURE: 97.9 F | WEIGHT: 167.8 LBS | SYSTOLIC BLOOD PRESSURE: 118 MMHG | DIASTOLIC BLOOD PRESSURE: 68 MMHG

## 2020-10-16 PROCEDURE — 99213 OFFICE O/P EST LOW 20 MIN: CPT | Performed by: NURSE PRACTITIONER

## 2020-10-16 RX ORDER — AMOXICILLIN 500 MG/1
500 CAPSULE ORAL 3 TIMES DAILY
COMMUNITY
End: 2022-04-21

## 2020-10-16 ASSESSMENT — ENCOUNTER SYMPTOMS: BACK PAIN: 0

## 2020-10-16 NOTE — PROGRESS NOTES
10/16/2020     Silvio Uriarte (:  1960) is a 64 y.o. female, here for evaluation of the following medical concerns:    ALLI Hogan presents today with left shoulder pain. She states that she has been struggling with this for months. She has received steroid injections previously, but they do not help. She was seen by ortho in July, and an xray was performed at that time. She received a steroid injection at that visit with the recommendation that she may benefit from PT if the injection provided no relief. She continues to have ongoing shoulder pain. More recently, the pain is radiating into her neck. She got a new mattress thinking that maybe her mattress was exacerbating symptoms, but this has not yet helped. Review of Systems   Constitutional: Negative for chills and fatigue. Musculoskeletal: Positive for arthralgias, myalgias and neck pain. Negative for back pain, gait problem, joint swelling and neck stiffness. Prior to Visit Medications    Medication Sig Taking? Authorizing Provider   amoxicillin (AMOXIL) 500 MG capsule Take 500 mg by mouth 3 times daily Yes Historical Provider, MD        Social History     Tobacco Use    Smoking status: Former Smoker     Packs/day: 0.00     Years: 0.00     Pack years: 0.00     Last attempt to quit:      Years since quittin.7    Smokeless tobacco: Never Used   Substance Use Topics    Alcohol use: Yes     Comment: patient has 2 drinks a night, wine or liquor        Vitals:    10/16/20 1455   BP: 118/68   Site: Left Upper Arm   Position: Sitting   Cuff Size: Large Adult   Pulse: 97   Temp: 97.9 °F (36.6 °C)   TempSrc: Tympanic   SpO2: 98%   Weight: 167 lb 12.8 oz (76.1 kg)  Comment: SHOES ON   Height: 5' 5\" (1.651 m)     Estimated body mass index is 27.92 kg/m² as calculated from the following:    Height as of this encounter: 5' 5\" (1.651 m). Weight as of this encounter: 167 lb 12.8 oz (76.1 kg).     Physical Exam  Constitutional: Appearance: Normal appearance. She is normal weight. Musculoskeletal:      Comments: No point tenderness or swelling to the left shoulder or neck. Slightly limited active range of motion with abduction. Able to perform full passive range of motion. Positive neer impingement. Right shoulder is normal.   Neurological:      Mental Status: She is alert. Psychiatric:         Mood and Affect: Mood normal.         Behavior: Behavior normal.         Thought Content: Thought content normal.         Judgment: Judgment normal.         ASSESSMENT/PLAN:  1. Chronic left shoulder pain  - Impingement vs partial rotator cuff tear  - Given lack of response to steroid injections, will refer to PT  - Educated to follow-up with ortho if PT does not relieve symptoms  - Ambulatory referral to Physical Therapy      Return if symptoms worsen or fail to improve. An electronic signature was used to authenticate this note.     --MANUELITO Em - CNP on 10/16/2020 at 8:40 PM

## 2020-10-22 ENCOUNTER — TELEPHONE (OUTPATIENT)
Dept: FAMILY MEDICINE CLINIC | Age: 60
End: 2020-10-22

## 2020-10-22 RX ORDER — MELOXICAM 15 MG/1
15 TABLET ORAL DAILY
Qty: 30 TABLET | Refills: 2 | Status: SHIPPED | OUTPATIENT
Start: 2020-10-22 | End: 2022-04-21

## 2020-10-22 NOTE — TELEPHONE ENCOUNTER
Kalyani De La O is her Ortho. He wanted to see her in 3 months if the pain wasn't better. She needs to make an appointment with him. I will send her something for pain until she gets in with ortho.

## 2020-10-22 NOTE — TELEPHONE ENCOUNTER
CALLED AND SPOKE TO PATIENT AND SHE SAID SHE HAS BEEN TAKING IBUPROFEN FOR THE PAIN, BUT ITS CAUSING HER A LOT OF STOMACH UPSET. WHEN I ASKED HER ABOUT DR. DUKES SHE HAD NO IDEA WHO I WAS TALKING ABOUT AND STATES SHE JUST WENT SOMEWHERE IN [facility] WHERE SHE WAS TOLD TO GET CORTISONE SHOTS.  SC

## 2020-10-28 ENCOUNTER — HOSPITAL ENCOUNTER (OUTPATIENT)
Dept: PHYSICAL THERAPY | Age: 60
Setting detail: THERAPIES SERIES
Discharge: HOME | End: 2020-10-28
Payer: COMMERCIAL

## 2020-10-28 PROCEDURE — 97110 THERAPEUTIC EXERCISES: CPT | Performed by: PHYSICAL THERAPIST

## 2020-10-28 PROCEDURE — 97161 PT EVAL LOW COMPLEX 20 MIN: CPT | Performed by: PHYSICAL THERAPIST

## 2020-10-28 NOTE — PLAN OF CARE
[place], [address] [facility], [address]  Phone: [phone]   Fax: [phone]                                                     Physical Therapy Certification    Dear Referring Practitioner: Dr. Nicolasa Johnson,    We had the pleasure of evaluating the following patient for physical therapy services at [address] [place] [address]. A summary of our findings can be found in the initial assessment below. This includes our plan of care. If you have any questions or concerns regarding these findings, please do not hesitate to contact me at the office phone number checked above. Thank you for the referral.       Physician Signature:_______________________________Date:__________________  By signing above (or electronic signature), therapists plan is approved by physician      Patient: Yeimy Marc   : 1960   MRN: [id number]  Referring Physician: Referring Practitioner: Dr. Nicolasa Johnson      Evaluation Date: 10/28/2020      Medical Diagnosis Information:  Diagnosis: X34.988, G89.29 (ICD-10-CM) - Chronic left shoulder pain   Treatment Diagnosis: L shoulder and cervical decreased ROM, decreased strength, increased pain                                         Insurance information: PT Insurance Information: [employer]    Precautions/ Contra-indications: None  Latex Allergy:  [x]NO      []YES  Preferred Language for Healthcare:   [x]English       []other:    SUBJECTIVE: Patient stated complaint: Pt. Reports pain started approximately 9 months ago in her L shoulder. Pt. Reports R shoulder presented similarly in 2017 and received therapy. Pt. Had a cortisone shot 20 but didn't notice relief. Pt. Reports pain is now traveling up her neck, across her back, and down her arm not past the elbow. Pt. Reports when she was reaching in her car the other day she had a genevieve horse jacqueline pain.      Relevant Medical History: no sig PMH  Functional Outcome: Quick DASH: raw score = 27; dysfunction = 36%    Pain Scale: 5/10 constant ache, 9/10 at worst  Easing factors: heat  Provocative factors: reahing into a cabinet, reaching around in her car    Type: [x]Constant   [x]Intermittent  [x]Radiating []Localized []other:     Numbness/Tingling: no    Occupation/School: Coffee shop owner - runner as well    Living Status/Prior Level of Function: Prior to this injury / incident, pt was independent with ADLs and IADLs      OBJECTIVE:   Hand dominance: Right    Palpation: L shoulder and UT globally hypersensitive to touch    Functional Mobility/Transfers: indpt    Posture: foreward head, forward shoulders    Bandages/Dressings/Incisions: none      Dermatomes Normal Abnormal Comments   Top of head (C1)   NT   Posterior occipital region (C2)   NT   Side of neck (C3)   NT   Top of shoulder (C4) x     Lateral deltoid (C5) x     Tip of thumb (C6) x     Distal middle finger (C7) x     Distal fifth finger (C8) x     Medial forearm (T1) x            PROM AROM    L R L R   Cervical Flexion  NT - WFL    Cervical Extension  NT - WFL  Neck pain    Cervical Rotation NT NT WFL  L UT pain WFL  L UT pain   Cervical Side-bend NT NT [facility] WFL  L UT pain   Shoulder Flexion  100 pain 152 98 pain 152   Shoulder Abduction  60 pain 148 72 pain 145   Shoulder External Rotation  38 @45abd 70 @90abd     Shoulder Internal Rotation  26 @45abd 42 @90abd ~L5 ~T7   Elbow Flexion        Elbow Extension            Strength (0-5) Left Right    Shoulder Shrug (C4)     Shoulder Flex 3+ pain 4   Shoulder Abd (C5) 4 pain 4+   Shoulder ER 4 pain 4+   Shoulder IR 3+ pain 4+   Biceps (C6) 5 pain 5   Triceps (C7)              Joint mobility: [address]   []Normal    [x]Hypo    []Hyper      Orthopaedic Special Tests Positive  Negative  NT Comments    Shoulder        Empty Can x      Drop Arm  x                                                    [x] Patient history, allergies, meds reviewed. Medical chart reviewed.  See facet dysfunction of cervical/thoracic spine    []Signs/symptoms consistent with pathology which may benefit from Dry needling     [x]other:  Signs/Symptoms consistent with L shoulder adhesive capsulitis and rotator cuff weaknss    Prognosis/Rehab Potential:      []Excellent   [x]Good    []Fair   []Poor    Tolerance of evaluation/treatment:    []Excellent   [x]Good    []Fair   []Poor    Physical Therapy Evaluation Complexity Justification  [x] A history of present problem with:  [] no personal factors and/or comorbidities that impact the plan of care;  []1-2 personal factors and/or comorbidities that impact the plan of care  [x]3 personal factors and/or comorbidities that impact the plan of care  [x] An examination of body systems using standardized tests and measures addressing any of the following: body structures and functions (impairments), activity limitations, and/or participation restrictions;:  [] a total of 1-2 or more elements   [x] a total of 3 or more elements   [] a total of 4 or more elements   [x] A clinical presentation with:  [x] stable and/or uncomplicated characteristics   [] evolving clinical presentation with changing characteristics  [] unstable and unpredictable characteristics;   [x] Clinical decision making of [x] low, [] moderate, [] high complexity using standardized patient assessment instrument and/or measurable assessment of functional outcome.     [x] EVAL (LOW) 88729 (typically 15 minutes face-to-face)  [] EVAL (MOD) 98239 (typically 30 minutes face-to-face)  [] EVAL (HIGH) 59714 (typically 45 minutes face-to-face)  [] RE-EVAL     PLAN:  Frequency/Duration:  1-2 days per week for 6-8 Weeks:  INTERVENTIONS:  [x] Therapeutic exercise including: strength training, ROM, for Upper extremity and core   [x]  NMR activation and proprioception for UE, scap and Core   [x] Manual therapy as indicated for shoulder, scapula and spine to include: Dry Needling/IASTM, STM, PROM, Gr I-IV mobilizations,

## 2020-10-28 NOTE — FLOWSHEET NOTE
Lucio Raygoza  Phone: [phone]   Fax: [phone]    Physical Therapy Treatment Note/ Progress Report:     Date:  10/28/2020    Patient Name:  Hernesto Rowley    :  1960  MRN: [id number]  Restrictions/Precautions:    Medical/Treatment Diagnosis Information:  Diagnosis: M25.512, G89.29 (ICD-10-CM) - Chronic left shoulder pain  Treatment Diagnosis: L shoulder and cervical decreased ROM, decreased strength, increased pain  Insurance/Certification information:  PT Insurance Information: [employer]  Physician Information:  Referring Practitioner: Dr. Brent Barboza of care signed (Y/N): []  Yes []  No     Date of Patient follow up with Physician:      Progress Report: []  Yes  [x]  No     Date Range for reporting period:  Beginning: 10/28/20  Ending:     Progress report due (10 Rx/or 30 days whichever is less): visit #10 or  (date)     Recertification due (POC duration/ or 90 days whichever is less): visit #16 or 20 (date)     Visit # Insurance Allowable Auth required?  Date Range   1 20 []  Yes  [x]  No n/a     Latex Allergy:  [x]NO      []YES  Preferred Language for Healthcare:   [x]English       []other:    Functional Scale:        Date assessed:  Quick DASH: raw score = 27; dysfunction = 36%  10/28/20     Pain level:  5/10     SUBJECTIVE:  See eval    OBJECTIVE: See eval   Observation:    Test measurements:      RESTRICTIONS/PRECAUTIONS: None    Exercises/Interventions:   Therapeutic Exercise (38988) Resistance / level Sets / Seconds  Reps Notes/Cues   pulleys npv      Supine cane flexion  10\" 10    Upper trap stretch  10\" 10 Porfirio    Table slides  10\" 10    Porfirio ER Green 2 10    Pendulums - M/L, A/P, CW, CCW 2# 1 3    ER ranger npv      Scapular retraction       Shoulder extension       Doorway pec stretch              Therapeutic Activities (43801)                                          Neuromuscular Re-ed (07542)       Tex Coleman tucks - supine  5\" 10                                Manual Intervention (71988)       Shld /GH Mobs npv      Post Cap mobs       Thoracic/Rib manipulation       CT MT/Mobs       PROM MT npv                 Pt. Education:  -pt educated on diagnosis, prognosis and expectations for rehab  -all pt questions were answered    Home Exercise Program:  Access Code: [email or web address]   URL: [email or web address]   Date: 10/28/2020   Prepared by: Parminder Nevarez     Exercises   Supine Chin Tuck - 10 reps - 3 sets - 1x daily - 7x weekly   Supine Shoulder Flexion with Dowel - 10 reps - 10\" hold - 1x daily - 7x weekly   Seated Gentle Upper Trapezius Stretch - 5 reps - 20\" hold - 1x daily - 7x weekly   Standing Shoulder External Rotation with Resistance - 10 reps - 3 sets - 1x daily - 7x weekly   Seated Shoulder Flexion Towel Slide at Table Top - 10 reps - 10 sec hold - 1x daily - 7x weekly       Therapeutic Exercise and NMR EXR  [x] (37847) Provided verbal/tactile cueing for activities related to strengthening, flexibility, endurance, ROM  for improvements in scapular, scapulothoracic and UE control with self care, reaching, carrying, lifting, house/yardwork, driving/computer work.    [] (99293) Provided verbal/tactile cueing for activities related to improving balance, coordination, kinesthetic sense, posture, motor skill, proprioception  to assist with  scapular, scapulothoracic and UE control with self care, reaching, carrying, lifting, house/yardwork, driving/computer work.  [] (34697) Therapist is in constant attendance of 2 or more patients providing skilled therapy interventions, but not providing any significant amount of measurable one-on-one time to either patient, for improvements in cervical, scapular, scapulothoracic and UE control with self care, reaching, carrying, lifting, house/yardwork, driving, computer work.      Therapeutic Activities:    [] (65028 or ) Provided verbal/tactile cueing for activities related to improving balance, coordination, kinesthetic sense, posture, motor skill, proprioception and motor activation to allow for proper function of scapular, scapulothoracic and UE control with self care, carrying, lifting, driving/computer work.      Home Exercise Program:    [x] (65180) Reviewed/Progressed HEP activities related to strengthening, flexibility, endurance, ROM of scapular, scapulothoracic and UE control with self care, reaching, carrying, lifting, house/yardwork, driving/computer work  [] (66456) Reviewed/Progressed HEP activities related to improving balance, coordination, kinesthetic sense, posture, motor skill, proprioception of scapular, scapulothoracic and UE control with self care, reaching, carrying, lifting, house/yardwork, driving/computer work      Manual Treatments:  PROM / STM / Oscillations-Mobs:  G-I, II, III, IV (PA's, Inf., Post.)  [] (66204) Provided manual therapy to mobilize soft tissue/joints of cervical/CT, scapular GHJ and UE for the purpose of modulating pain, promoting relaxation,  increasing ROM, reducing/eliminating soft tissue swelling/inflammation/restriction, improving soft tissue extensibility and allowing for proper ROM for normal function with self care, reaching, carrying, lifting, house/yardwork, driving/computer work    Modalities:      Charges:  Timed Code Treatment Minutes: 14   Total Treatment Minutes: 43       [x] EVAL - LOW (78622)   [] EVAL - MOD (74027)  [] EVAL - HIGH (84811)  [] RE-EVAL (19348)  [x] PU(31515) x 1       [] Ionto  [] NMR (60062) x       [] Vaso  [] Manual (92856) x       [] Ultrasound  [] TA x        [] Mech Traction (92479)  [] Aquatic Therapy x     [] ES (un) (02533):   [] Home Management Training x  [] ES(attended) (35760)   [] Dry Needling 1-2 muscles (07968):  [] Dry Needling 3+ muscles (333931  [] Group:      [] Other:                    GOALS:  Patient stated goal: to get back to doing everything  [] Progressing: [] Met: [] Not Met: [] Adjusted    Therapist goals for Patient:   Short Term Goals: To be achieved in: 2 weeks  1. Independent in HEP and progression per patient tolerance, in order to prevent re-injury. [] Progressing: [] Met: [] Not Met: [] Adjusted  2. Patient will have a decrease in pain to facilitate improvement in movement, function, and ADLs as indicated by Functional Deficits. [] Progressing: [] Met: [] Not Met: [] Adjusted    Long Term Goals: To be achieved in: 6-8 weeks  1. Disability index score of 17% or less for the UEFI or Quick DASH to assist with reaching prior level of function. [] Progressing: [] Met: [] Not Met: [] Adjusted  2. Patient will demonstrate increased AROM to L shoulder flexion 150*, ABD to 150*, ER to 70*, IR to 40* to allow for proper joint functioning as indicated by patients Functional Deficits. [] Progressing: [] Met: [] Not Met: [] Adjusted  3. Patient will demonstrate an increase in Strength to 5/5 all shoulder musculature to allow for proper functional mobility as indicated by patients Functional Deficits. [] Progressing: [] Met: [] Not Met: [] Adjusted  4. Patient will return to functional activities including driving and cooking without increased symptoms or restriction. [] Progressing: [] Met: [] Not Met: [] Adjusted  5. Patient will return to household management and work duties without increased pain or restriction. [] Progressing: [] Met: [] Not Met: [] Adjusted    Overall Progression Towards Functional goals/ Treatment Progress Update:  [] Patient is progressing as expected towards functional goals listed. [] Progression is slowed due to complexities/Impairments listed. [] Progression has been slowed due to co-morbidities.   [x] Plan just implemented, too soon to assess goals progression <30days   [] Goals require adjustment due to lack of progress  [] Patient is not progressing as expected and requires additional follow up with physician  [] Other    Persisting Functional Limitations/Impairments:  []Sitting []Standing   [x]Transfers  [x]Sleeping   [x]Reaching [x]Lifting   [x]ADLs [x]Housework  [x]Driving [x]Job related tasks  [x]Sports/Recreation []Other:    ASSESSMENT:  See eval  Treatment/Activity Tolerance:  [x] Pt able to complete treatment [] Patient limited by fatique  [] Patient limited by pain  [] Patient limited by other medical complications  [] Other:     Prognosis: [x] Good [] Fair  [] Poor    Patient Requires Follow-up: [x] Yes  [] No    Return to Play:    [x]  N/A     []  Stage 1: Intro to Strength   []  Stage 2: Dynamic Strength and Intro to Plyometrics   []  Stage 3: Advanced Plyometrics and Intro to Throwing   []  Stage 4: Sport specific Training/Return to Sport     []  Ready to Return to Play, [employer]   []  Not Ready for Return to Sports   Comments:      PLAN: See eval. PT 1-2x / week for 6-8 weeks. [] Continue per plan of care [] Alter current plan (see comments)  [x] Plan of care initiated [] Hold pending MD visit [] Discharge    Electronically signed by: Lulú Argueta PT, DPT    Beatriz Orozco SPT  Therapist was present, directed the patient's care, made skilled judgement, and was responsible for assessment and treatment of the patient. Note: If patient does not return for scheduled/ recommended follow up visits, this note will serve as a discharge from care along with most recent update on progress.

## 2020-11-04 ENCOUNTER — HOSPITAL ENCOUNTER (OUTPATIENT)
Dept: PHYSICAL THERAPY | Age: 60
Setting detail: THERAPIES SERIES
Discharge: HOME | End: 2020-11-04
Payer: COMMERCIAL

## 2020-11-04 PROCEDURE — 97140 MANUAL THERAPY 1/> REGIONS: CPT

## 2020-11-04 PROCEDURE — 97110 THERAPEUTIC EXERCISES: CPT

## 2020-11-04 NOTE — FLOWSHEET NOTE
(01841)                                          Neuromuscular Re-ed (10271)       Chin tucks - supine  5\" 10                                Manual Intervention ([id number])       Shld /GH Mobs  2'  Added 11/4   Post Cap mobs       Thoracic/Rib manipulation       CT MT/Mobs       PROM MT  8'  Added 11/4              Pt. Education:  -pt educated on diagnosis, prognosis and expectations for rehab  -all pt questions were answered    Home Exercise Program:  Access Code: [email or web address]   URL: [email or web address]   Date: 10/28/2020   Prepared by: Dagoberto Cast     Exercises   Supine Chin Tuck - 10 reps - 3 sets - 1x daily - 7x weekly   Supine Shoulder Flexion with Dowel - 10 reps - 10\" hold - 1x daily - 7x weekly   Seated Gentle Upper Trapezius Stretch - 5 reps - 20\" hold - 1x daily - 7x weekly   Standing Shoulder External Rotation with Resistance - 10 reps - 3 sets - 1x daily - 7x weekly   Seated Shoulder Flexion Towel Slide at Table Top - 10 reps - 10 sec hold - 1x daily - 7x weekly       Therapeutic Exercise and NMR EXR  [x] (12054) Provided verbal/tactile cueing for activities related to strengthening, flexibility, endurance, ROM  for improvements in scapular, scapulothoracic and UE control with self care, reaching, carrying, lifting, house/yardwork, driving/computer work.    [] (88627) Provided verbal/tactile cueing for activities related to improving balance, coordination, kinesthetic sense, posture, motor skill, proprioception  to assist with  scapular, scapulothoracic and UE control with self care, reaching, carrying, lifting, house/yardwork, driving/computer work.  [] (20202) Therapist is in constant attendance of 2 or more patients providing skilled therapy interventions, but not providing any significant amount of measurable one-on-one time to either patient, for improvements in cervical, scapular, scapulothoracic and UE control with self care, reaching, carrying, lifting, house/yardwork, driving, computer work. Therapeutic Activities:    [] (41086 or 98831) Provided verbal/tactile cueing for activities related to improving balance, coordination, kinesthetic sense, posture, motor skill, proprioception and motor activation to allow for proper function of scapular, scapulothoracic and UE control with self care, carrying, lifting, driving/computer work.      Home Exercise Program:    [x] (74866) Reviewed/Progressed HEP activities related to strengthening, flexibility, endurance, ROM of scapular, scapulothoracic and UE control with self care, reaching, carrying, lifting, house/yardwork, driving/computer work  [] (71231) Reviewed/Progressed HEP activities related to improving balance, coordination, kinesthetic sense, posture, motor skill, proprioception of scapular, scapulothoracic and UE control with self care, reaching, carrying, lifting, house/yardwork, driving/computer work      Manual Treatments:  PROM / STM / Oscillations-Mobs:  G-I, II, III, IV (PA's, Inf., Post.)  [] (66061) Provided manual therapy to mobilize soft tissue/joints of cervical/CT, scapular GHJ and UE for the purpose of modulating pain, promoting relaxation,  increasing ROM, reducing/eliminating soft tissue swelling/inflammation/restriction, improving soft tissue extensibility and allowing for proper ROM for normal function with self care, reaching, carrying, lifting, house/yardwork, driving/computer work    Modalities:      Charges:  Timed Code Treatment Minutes: 45   Total Treatment Minutes: 45       [] EVAL - LOW (72943)   [] EVAL - MOD (65225)  [] EVAL - HIGH (53257)  [] RE-EVAL (37593)  [x] GB(39456) x 2       [] Ionto  [] NMR (28060) x       [] Vaso  [x] Manual (51841) x 1      [] Ultrasound  [] TA x        [] Mech Traction (69406)  [] Aquatic Therapy x      [] ES (un) (11750):   [] Home Management Training x  [] ES(attended) (83915)   [] Dry Needling 1-2 muscles (94632):  [] Dry Needling 3+ muscles (778659  [] Group:      [] Other: GOALS:  Patient stated goal: to get back to doing everything  [] Progressing: [] Met: [] Not Met: [] Adjusted    Therapist goals for Patient:   Short Term Goals: To be achieved in: 2 weeks  1. Independent in HEP and progression per patient tolerance, in order to prevent re-injury. [] Progressing: [] Met: [] Not Met: [] Adjusted  2. Patient will have a decrease in pain to facilitate improvement in movement, function, and ADLs as indicated by Functional Deficits. [] Progressing: [] Met: [] Not Met: [] Adjusted    Long Term Goals: To be achieved in: 6-8 weeks  1. Disability index score of 17% or less for the UEFI or Quick DASH to assist with reaching prior level of function. [] Progressing: [] Met: [] Not Met: [] Adjusted  2. Patient will demonstrate increased AROM to L shoulder flexion 150*, ABD to 150*, ER to 70*, IR to 40* to allow for proper joint functioning as indicated by patients Functional Deficits. [] Progressing: [] Met: [] Not Met: [] Adjusted  3. Patient will demonstrate an increase in Strength to 5/5 all shoulder musculature to allow for proper functional mobility as indicated by patients Functional Deficits. [] Progressing: [] Met: [] Not Met: [] Adjusted  4. Patient will return to functional activities including driving and cooking without increased symptoms or restriction. [] Progressing: [] Met: [] Not Met: [] Adjusted  5. Patient will return to household management and work duties without increased pain or restriction. [] Progressing: [] Met: [] Not Met: [] Adjusted    Overall Progression Towards Functional goals/ Treatment Progress Update:  [] Patient is progressing as expected towards functional goals listed. [] Progression is slowed due to complexities/Impairments listed. [] Progression has been slowed due to co-morbidities.   [x] Plan just implemented, too soon to assess goals progression <30days   [] Goals require adjustment due to lack of progress  [] Patient is not progressing as expected and requires additional follow up with physician  [] Other    Persisting Functional Limitations/Impairments:  []Sitting []Standing   [x]Transfers  [x]Sleeping   [x]Reaching [x]Lifting   [x]ADLs [x]Housework  [x]Driving [x]Job related tasks  [x]Sports/Recreation []Other:    ASSESSMENT:  Pt demonstrates tightness throughout shoulder joint with manual therapy and stretches this date. Joint mobilizations performed inferiorly and posteriorly to increase ROM with limitations noted in all directions specifically into IR. Discussed with pt to minimize stretching into painful ranges of motion and to point of gentle stretch to minimize increased pain and muscle tightness. Pt reports feeling more lose with slightly increased motion at end of session this date. Continue to upgrade exercises as tolerated to improve functional mobility and strength to return to PLOF without pain and restrictions. Treatment/Activity Tolerance:  [x] Pt able to complete treatment [] Patient limited by fatique  [] Patient limited by pain  [] Patient limited by other medical complications  [] Other:     Prognosis: [x] Good [] Fair  [] Poor    Patient Requires Follow-up: [x] Yes  [] No    Return to Play:    [x]  N/A   []  Stage 1: Intro to Strength   []  Stage 2: Dynamic Strength and Intro to Plyometrics   []  Stage 3: Advanced Plyometrics and Intro to Throwing   []  Stage 4: Sport specific Training/Return to Sport   []  Ready to Return to Play, [employer]   []  Not Ready for Return to Sports   Comments:      PLAN: See eval. PT 1-2x / week for 6-8 weeks. [] Continue per plan of care [] Alter current plan (see comments)  [x] Plan of care initiated [] Hold pending MD visit [] Discharge    Electronically signed by: Ladan Monte    Note: If patient does not return for scheduled/ recommended follow up visits, this note will serve as a discharge from care along with most recent update on progress.

## 2020-11-11 ENCOUNTER — HOSPITAL ENCOUNTER (OUTPATIENT)
Dept: PHYSICAL THERAPY | Age: 60
Setting detail: THERAPIES SERIES
Discharge: HOME | End: 2020-11-11
Payer: COMMERCIAL

## 2020-11-11 NOTE — PROGRESS NOTES
[address] [facility]    Physical Therapy  Cancellation/No-show Note  Patient Name:  Du Street  :  1960   Date:  2020    Cancelled visits to date: 1  No-shows to date: 0    For today's appointment patient:  [x]  Cancelled  []  Rescheduled appointment  []  No-show     Reason given by patient:  []  Patient ill  []  Conflicting appointment  []  No transportation    [x]  Conflict with work  []  No reason given  []  Other:     Comments:      Phone call information:   []  Phone call made today to patient at _ time at number provided:      []  Patient answered, conversation as follows:    []  Patient did not answer, message left as follows:  []  Phone call not made today  [x]  Phone call not needed - pt contacted us to cancel and provided reason for cancellation.      Electronically signed by:  Kirstin Khan PT

## 2020-11-12 ENCOUNTER — HOSPITAL ENCOUNTER (OUTPATIENT)
Dept: PHYSICAL THERAPY | Age: 60
Setting detail: THERAPIES SERIES
Discharge: HOME | End: 2020-11-12
Payer: COMMERCIAL

## 2020-11-12 PROCEDURE — 97110 THERAPEUTIC EXERCISES: CPT | Performed by: PHYSICAL THERAPIST

## 2020-11-12 PROCEDURE — 97140 MANUAL THERAPY 1/> REGIONS: CPT | Performed by: PHYSICAL THERAPIST

## 2020-11-12 NOTE — FLOWSHEET NOTE
[facility]  Phone: [phone]   Fax: [phone]    Physical Therapy Treatment Note/ Progress Report:     Date:  2020    Patient Name:  Doyle Thornton    :  1960  MRN: [id number]  Restrictions/Precautions:    Medical/Treatment Diagnosis Information:  Diagnosis: M25.512, G89.29 (ICD-10-CM) - Chronic left shoulder pain  Treatment Diagnosis: L shoulder and cervical decreased ROM, decreased strength, increased pain  Insurance/Certification information:  PT Insurance Information: Solidarity  Physician Information:  Referring Practitioner: Dr. Jen Raman of care signed (Y/N): [x]  Yes []  No     Date of Patient follow up with Physician:      Progress Report: []  Yes  [x]  No     Date Range for reporting period:  Beginning: 10/28/20  Ending:     Progress report due (10 Rx/or 30 days whichever is less): visit #10 or  (date)     Recertification due (POC duration/ or 90 days whichever is less): visit #16 or 20 (date)     Visit # Insurance Allowable Auth required? Date Range   3  []  Yes  [x]  No n/a     Latex Allergy:  [x]NO      []YES  Preferred Language for Healthcare:   [x]English       []other:    Functional Scale:        Date assessed:  Quick DASH: raw score = 27; dysfunction = 36%  10/28/20     Pain level:  8-9/10     SUBJECTIVE:  Pt. Reports that her should continues to be sore up into her neck and down into her arm. Pt. States that it is keeping her awake at night. Pt. Reports compliance with HEP. Pt. Notes that she felt a lot better following stretching previous therapy session and this lasted for a couple dates.      OBJECTIVE: See eval   Observation:    Test measurements:      RESTRICTIONS/PRECAUTIONS: None    Exercises/Interventions:   Therapeutic Exercise (15555) Resistance / level Sets / Seconds  Reps Notes/Cues   pulleys Flex, abd 10\" 15 Added 11/4   Supine cane flexion  10\" 10    Upper trap stretch  10\" 10 Porfirio    Table slides  10\" 10    Porfirio ER orange 2 10    Pendulums - M/L, A/P, CW, CCW 2# 1 3    ER ranger @70abd 10\" 10 Added 11/4   Scapular retraction green 2 10 added 11/12   Shoulder extension green 2 10 Added 11/12   Doorway pec stretch Hands down 20\" 3 Added 11/12   IR towel stretch  10\" 10 Added 11/12                        Therapeutic Activities (25456)                                          Neuromuscular Re-ed (10491)       Chin tucks - supine  5\" 10                                Manual Intervention (33252)       Shld /GH Mobs GII/III posterior & inferior 3'  Added 11/4   Post Cap mobs       Thoracic/Rib manipulation       CT MT/Mobs       PROM MT All directions 10'  Added 11/4              Pt. Education:  -all pt questions were answered    Home Exercise Program:  Access Code: [email or web address]   URL: [email or web address]   Date: 10/28/2020   Prepared by: Franci Ventura     Exercises   Supine Chin Tuck - 10 reps - 3 sets - 1x daily - 7x weekly   Supine Shoulder Flexion with Dowel - 10 reps - 10\" hold - 1x daily - 7x weekly   Seated Gentle Upper Trapezius Stretch - 5 reps - 20\" hold - 1x daily - 7x weekly   Standing Shoulder External Rotation with Resistance - 10 reps - 3 sets - 1x daily - 7x weekly   Seated Shoulder Flexion Towel Slide at Table Top - 10 reps - 10 sec hold - 1x daily - 7x weekly       Therapeutic Exercise and NMR EXR  [x] (65794) Provided verbal/tactile cueing for activities related to strengthening, flexibility, endurance, ROM  for improvements in scapular, scapulothoracic and UE control with self care, reaching, carrying, lifting, house/yardwork, driving/computer work.    [] (94099) Provided verbal/tactile cueing for activities related to improving balance, coordination, kinesthetic sense, posture, motor skill, proprioception  to assist with  scapular, scapulothoracic and UE control with self care, reaching, carrying, lifting, house/yardwork, driving/computer work.  [] (70942) Therapist is in constant attendance of 2 or more patients providing skilled therapy interventions, but not providing any significant amount of measurable one-on-one time to either patient, for improvements in cervical, scapular, scapulothoracic and UE control with self care, reaching, carrying, lifting, house/yardwork, driving, computer work. Therapeutic Activities:    [] (04943 or 90387) Provided verbal/tactile cueing for activities related to improving balance, coordination, kinesthetic sense, posture, motor skill, proprioception and motor activation to allow for proper function of scapular, scapulothoracic and UE control with self care, carrying, lifting, driving/computer work.      Home Exercise Program:    [x] (12536) Reviewed/Progressed HEP activities related to strengthening, flexibility, endurance, ROM of scapular, scapulothoracic and UE control with self care, reaching, carrying, lifting, house/yardwork, driving/computer work  [] (54021) Reviewed/Progressed HEP activities related to improving balance, coordination, kinesthetic sense, posture, motor skill, proprioception of scapular, scapulothoracic and UE control with self care, reaching, carrying, lifting, house/yardwork, driving/computer work      Manual Treatments:  PROM / STM / Oscillations-Mobs:  G-I, II, III, IV (PA's, Inf., Post.)  [] (74368) Provided manual therapy to mobilize soft tissue/joints of cervical/CT, scapular GHJ and UE for the purpose of modulating pain, promoting relaxation,  increasing ROM, reducing/eliminating soft tissue swelling/inflammation/restriction, improving soft tissue extensibility and allowing for proper ROM for normal function with self care, reaching, carrying, lifting, house/yardwork, driving/computer work    Modalities:      Charges:  Timed Code Treatment Minutes: 44   Total Treatment Minutes: 44       [] EVAL - LOW (99273)   [] EVAL - MOD (92298)  [] EVAL - HIGH (80476)  [] RE-EVAL (68597)  [x] WL(37985) x 2       [] Ionto  [] NMR (12638) x       [] Vaso  [x] Manual (49252) x 1      [] Ultrasound  [] TA x        [] Mech Traction (01074)  [] Aquatic Therapy x      [] ES (un) (61920):   [] Home Management Training x  [] ES(attended) (26565)   [] Dry Needling 1-2 muscles (78781):  [] Dry Needling 3+ muscles (750742  [] Group:      [] Other:                    GOALS:  Patient stated goal: to get back to doing everything  [] Progressing: [] Met: [] Not Met: [] Adjusted    Therapist goals for Patient:   Short Term Goals: To be achieved in: 2 weeks  1. Independent in HEP and progression per patient tolerance, in order to prevent re-injury. [] Progressing: [] Met: [] Not Met: [] Adjusted  2. Patient will have a decrease in pain to facilitate improvement in movement, function, and ADLs as indicated by Functional Deficits. [] Progressing: [] Met: [] Not Met: [] Adjusted    Long Term Goals: To be achieved in: 6-8 weeks  1. Disability index score of 17% or less for the UEFI or Quick DASH to assist with reaching prior level of function. [] Progressing: [] Met: [] Not Met: [] Adjusted  2. Patient will demonstrate increased AROM to L shoulder flexion 150*, ABD to 150*, ER to 70*, IR to 40* to allow for proper joint functioning as indicated by patients Functional Deficits. [] Progressing: [] Met: [] Not Met: [] Adjusted  3. Patient will demonstrate an increase in Strength to 5/5 all shoulder musculature to allow for proper functional mobility as indicated by patients Functional Deficits. [] Progressing: [] Met: [] Not Met: [] Adjusted  4. Patient will return to functional activities including driving and cooking without increased symptoms or restriction. [] Progressing: [] Met: [] Not Met: [] Adjusted  5. Patient will return to household management and work duties without increased pain or restriction.     [] Progressing: [] Met: [] Not Met: [] Adjusted    Overall Progression Towards Functional goals/ Treatment Progress Update:  [] Patient is patient does not return for scheduled/ recommended follow up visits, this note will serve as a discharge from care along with most recent update on progress.

## 2020-11-18 ENCOUNTER — HOSPITAL ENCOUNTER (OUTPATIENT)
Dept: PHYSICAL THERAPY | Age: 60
Setting detail: THERAPIES SERIES
Discharge: HOME | End: 2020-11-18
Payer: COMMERCIAL

## 2020-11-18 PROCEDURE — 97110 THERAPEUTIC EXERCISES: CPT

## 2020-11-18 PROCEDURE — 97112 NEUROMUSCULAR REEDUCATION: CPT

## 2020-11-18 PROCEDURE — 97140 MANUAL THERAPY 1/> REGIONS: CPT

## 2020-11-18 NOTE — FLOWSHEET NOTE
[facility]  Phone: [phone]   Fax: [phone]    Physical Therapy Treatment Note/ Progress Report:     Date:  2020    Patient Name:  Ernestina Madera    :  1960  MRN: [id number]  Restrictions/Precautions:    Medical/Treatment Diagnosis Information:  Diagnosis: M25.512, G89.29 (ICD-10-CM) - Chronic left shoulder pain  Treatment Diagnosis: L shoulder and cervical decreased ROM, decreased strength, increased pain  Insurance/Certification information:  PT Insurance Information: Solidarity  Physician Information:  Referring Practitioner: Dr. Breezy Higgins of care signed (Y/N): [x]  Yes []  No     Date of Patient follow up with Physician:      Progress Report: []  Yes  [x]  No     Date Range for reporting period:  Beginning: 10/28/20  Ending:     Progress report due (10 Rx/or 30 days whichever is less): visit #10 or  (date)     Recertification due (POC duration/ or 90 days whichever is less): visit #16 or 20 (date)     Visit # Insurance Allowable Auth required? Date Range   4 20 []  Yes  [x]  No n/a     Latex Allergy:  [x]NO      []YES  Preferred Language for Healthcare:   [x]English       []other:    Functional Scale:        Date assessed:  Quick DASH: raw score = 27; dysfunction = 36%  10/28/20     Pain level:  8-9/10     SUBJECTIVE:  Pt states they were unable to get any sleep last night 2/2 L shoulder pain and ache. Pt states pulleys are going well at home and doesn't have much trouble or resistance in flexion plane.     OBJECTIVE: See eval   Observation:    Test measurements:    2020: AROM L shoulder: IR: L5      RESTRICTIONS/PRECAUTIONS: None    Exercises/Interventions:   Therapeutic Exercise (17420) Resistance / level Sets / Seconds  Reps Notes/Cues   pulleys Flex, abd 10\" 15 Added    Supine cane flexion  10\" 10    Upper trap stretch  10\" 10 Porfirio    Table slides  10\" 10    Porfirio ER orange 2 10    Pendulums - skill, proprioception  to assist with  scapular, scapulothoracic and UE control with self care, reaching, carrying, lifting, house/yardwork, driving/computer work.  [] (57270) Therapist is in constant attendance of 2 or more patients providing skilled therapy interventions, but not providing any significant amount of measurable one-on-one time to either patient, for improvements in cervical, scapular, scapulothoracic and UE control with self care, reaching, carrying, lifting, house/yardwork, driving, computer work. Therapeutic Activities:    [] (45811 or 61432) Provided verbal/tactile cueing for activities related to improving balance, coordination, kinesthetic sense, posture, motor skill, proprioception and motor activation to allow for proper function of scapular, scapulothoracic and UE control with self care, carrying, lifting, driving/computer work.      Home Exercise Program:    [x] (07966) Reviewed/Progressed HEP activities related to strengthening, flexibility, endurance, ROM of scapular, scapulothoracic and UE control with self care, reaching, carrying, lifting, house/yardwork, driving/computer work  [] (56287) Reviewed/Progressed HEP activities related to improving balance, coordination, kinesthetic sense, posture, motor skill, proprioception of scapular, scapulothoracic and UE control with self care, reaching, carrying, lifting, house/yardwork, driving/computer work      Manual Treatments:  PROM / STM / Oscillations-Mobs:  G-I, II, III, IV (PA's, Inf., Post.)  [] (01379) Provided manual therapy to mobilize soft tissue/joints of cervical/CT, scapular GHJ and UE for the purpose of modulating pain, promoting relaxation,  increasing ROM, reducing/eliminating soft tissue swelling/inflammation/restriction, improving soft tissue extensibility and allowing for proper ROM for normal function with self care, reaching, carrying, lifting, house/yardwork, driving/computer work    Modalities:  CP x 10' Charges:  Timed Code Treatment Minutes: 44   Total Treatment Minutes: 54       [] EVAL - LOW (93686)   [] EVAL - MOD (40812)  [] EVAL - HIGH (29995)  [] RE-EVAL (42374)  [x] PM(89233) x 1       [] Ionto  [x] NMR (45730) x  1     [] Vaso  [x] Manual (53402) x 1      [] Ultrasound  [] TA x        [] Mech Traction (81617)  [] Aquatic Therapy x      [] ES (un) (20116):   [] Home Management Training x  [] ES(attended) (20949)   [] Dry Needling 1-2 muscles (87763):  [] Dry Needling 3+ muscles (165684  [] Group:      [] Other:                    GOALS:  Patient stated goal: to get back to doing everything  [] Progressing: [] Met: [] Not Met: [] Adjusted    Therapist goals for Patient:   Short Term Goals: To be achieved in: 2 weeks  1. Independent in HEP and progression per patient tolerance, in order to prevent re-injury. [] Progressing: [] Met: [] Not Met: [] Adjusted  2. Patient will have a decrease in pain to facilitate improvement in movement, function, and ADLs as indicated by Functional Deficits. [] Progressing: [] Met: [] Not Met: [] Adjusted    Long Term Goals: To be achieved in: 6-8 weeks  1. Disability index score of 17% or less for the UEFI or Quick DASH to assist with reaching prior level of function. [] Progressing: [] Met: [] Not Met: [] Adjusted  2. Patient will demonstrate increased AROM to L shoulder flexion 150*, ABD to 150*, ER to 70*, IR to 40* to allow for proper joint functioning as indicated by patients Functional Deficits. [] Progressing: [] Met: [] Not Met: [] Adjusted  3. Patient will demonstrate an increase in Strength to 5/5 all shoulder musculature to allow for proper functional mobility as indicated by patients Functional Deficits. [] Progressing: [] Met: [] Not Met: [] Adjusted  4. Patient will return to functional activities including driving and cooking without increased symptoms or restriction. [] Progressing: [] Met: [] Not Met: [] Adjusted  5.  Patient will return to household management and work duties without increased pain or restriction. [] Progressing: [] Met: [] Not Met: [] Adjusted    Overall Progression Towards Functional goals/ Treatment Progress Update:  [] Patient is progressing as expected towards functional goals listed. [] Progression is slowed due to complexities/Impairments listed. [] Progression has been slowed due to co-morbidities. [x] Plan just implemented, too soon to assess goals progression <30days   [] Goals require adjustment due to lack of progress  [] Patient is not progressing as expected and requires additional follow up with physician  [] Other    Persisting Functional Limitations/Impairments:  []Sitting []Standing   []Transfers  [x]Sleeping   [x]Reaching [x]Lifting   [x]ADLs [x]Housework  [x]Driving [x]Job related tasks  [x]Sports/Recreation []Other:    ASSESSMENT:  Pt has purchased pulleys and AAROM improving in flexion and ABD during pulleys in clinic today. Emphasis of tx on improving periscapular facilitation to improve proprioception and scapulohumeral coordination. Scapular hypomobility was displayed during Scap Mobs and tactile cueing was provided during SL ABD to improve scapular depression and upward rotation to improve L shoulder function and ROM. Pt instructed on appropriate self care modalities with the use of heat/ice throughout the day to decrease symptoms and improve outcomes.    Treatment/Activity Tolerance:  [x] Pt able to complete treatment [] Patient limited by fatique  [] Patient limited by pain  [] Patient limited by other medical complications  [] Other:     Prognosis: [x] Good [] Fair  [] Poor    Patient Requires Follow-up: [x] Yes  [] No    Return to Play:    [x]  N/A   []  Stage 1: Intro to Strength   []  Stage 2: Dynamic Strength and Intro to Plyometrics   []  Stage 3: Advanced Plyometrics and Intro to Throwing   []  Stage 4: Sport specific Training/Return to Sport   []  Ready to Return to Play, [employer]   []  Not Ready for Return to Sports    Comments:      PLAN: See eval. PT 1-2x / week for 6-8 weeks. [x] Continue per plan of care [] Alter current plan (see comments)  [] Plan of care initiated [] Hold pending MD visit [] Discharge    Electronically signed by: Pascual Askew PTA, [id number]     Note: If patient does not return for scheduled/ recommended follow up visits, this note will serve as a discharge from care along with most recent update on progress.

## 2020-11-24 ENCOUNTER — HOSPITAL ENCOUNTER (OUTPATIENT)
Dept: PHYSICAL THERAPY | Age: 60
Setting detail: THERAPIES SERIES
Discharge: HOME | End: 2020-11-24
Payer: COMMERCIAL

## 2020-11-24 PROCEDURE — 97140 MANUAL THERAPY 1/> REGIONS: CPT

## 2020-11-24 PROCEDURE — 97110 THERAPEUTIC EXERCISES: CPT

## 2020-11-24 NOTE — FLOWSHEET NOTE
balance, coordination, kinesthetic sense, posture, motor skill, proprioception  to assist with  scapular, scapulothoracic and UE control with self care, reaching, carrying, lifting, house/yardwork, driving/computer work.  [] (79022) Therapist is in constant attendance of 2 or more patients providing skilled therapy interventions, but not providing any significant amount of measurable one-on-one time to either patient, for improvements in cervical, scapular, scapulothoracic and UE control with self care, reaching, carrying, lifting, house/yardwork, driving, computer work. Therapeutic Activities:    [] (15726 or 91735) Provided verbal/tactile cueing for activities related to improving balance, coordination, kinesthetic sense, posture, motor skill, proprioception and motor activation to allow for proper function of scapular, scapulothoracic and UE control with self care, carrying, lifting, driving/computer work.      Home Exercise Program:    [x] (28349) Reviewed/Progressed HEP activities related to strengthening, flexibility, endurance, ROM of scapular, scapulothoracic and UE control with self care, reaching, carrying, lifting, house/yardwork, driving/computer work  [] (16646) Reviewed/Progressed HEP activities related to improving balance, coordination, kinesthetic sense, posture, motor skill, proprioception of scapular, scapulothoracic and UE control with self care, reaching, carrying, lifting, house/yardwork, driving/computer work      Manual Treatments:  PROM / STM / Oscillations-Mobs:  G-I, II, III, IV (PA's, Inf., Post.)  [] (10127) Provided manual therapy to mobilize soft tissue/joints of cervical/CT, scapular GHJ and UE for the purpose of modulating pain, promoting relaxation,  increasing ROM, reducing/eliminating soft tissue swelling/inflammation/restriction, improving soft tissue extensibility and allowing for proper ROM for normal function with self care, reaching, carrying, lifting, house/yardwork, driving/computer work    Modalities:  CP x 15'     Charges:  Timed Code Treatment Minutes: 44   Total Treatment Minutes: 54       [] EVAL - LOW (15380)   [] EVAL - MOD (48912)  [] EVAL - HIGH (28102)  [] RE-EVAL (01969)  [x] RAY(44611) x 1       [] Ionto  [] NMR (66754) x       [] Vaso  [x] Manual (77326) x2      [] Ultrasound  [] TA x        [] Mech Traction (15835)  [] Aquatic Therapy x      [] ES (un) (24291):   [] Home Management Training x  [] ES(attended) (52193)   [] Dry Needling 1-2 muscles (99215):  [] Dry Needling 3+ muscles (649719  [] Group:      [] Other:                    GOALS:  Patient stated goal: to get back to doing everything  [] Progressing: [] Met: [] Not Met: [] Adjusted    Therapist goals for Patient:   Short Term Goals: To be achieved in: 2 weeks  1. Independent in HEP and progression per patient tolerance, in order to prevent re-injury. [] Progressing: [] Met: [] Not Met: [] Adjusted  2. Patient will have a decrease in pain to facilitate improvement in movement, function, and ADLs as indicated by Functional Deficits. [] Progressing: [] Met: [] Not Met: [] Adjusted    Long Term Goals: To be achieved in: 6-8 weeks  1. Disability index score of 17% or less for the UEFI or Quick DASH to assist with reaching prior level of function. [] Progressing: [] Met: [] Not Met: [] Adjusted  2. Patient will demonstrate increased AROM to L shoulder flexion 150*, ABD to 150*, ER to 70*, IR to 40* to allow for proper joint functioning as indicated by patients Functional Deficits. [] Progressing: [] Met: [] Not Met: [] Adjusted  3. Patient will demonstrate an increase in Strength to 5/5 all shoulder musculature to allow for proper functional mobility as indicated by patients Functional Deficits. [] Progressing: [] Met: [] Not Met: [] Adjusted  4. Patient will return to functional activities including driving and cooking without increased symptoms or restriction.    [] Progressing: [] Met: [] Not Met: [] Adjusted  5. Patient will return to household management and work duties without increased pain or restriction. [] Progressing: [] Met: [] Not Met: [] Adjusted    Overall Progression Towards Functional goals/ Treatment Progress Update:  [] Patient is progressing as expected towards functional goals listed. [] Progression is slowed due to complexities/Impairments listed. [] Progression has been slowed due to co-morbidities. [x] Plan just implemented, too soon to assess goals progression <30days   [] Goals require adjustment due to lack of progress  [] Patient is not progressing as expected and requires additional follow up with physician  [] Other    Persisting Functional Limitations/Impairments:  []Sitting []Standing   []Transfers  [x]Sleeping   [x]Reaching [x]Lifting   [x]ADLs [x]Housework  [x]Driving [x]Job related tasks  [x]Sports/Recreation []Other:    ASSESSMENT:  Pt continues to demonstrate significant joint tightness and restrictions in all motions especially abduction and rotation motions. Difficulty with passive abduction more than ~80 degrees without shoulder elevation. Inferior and anterior shoulder joint capsule tightness limits significant amount of motion. Continue with skilled therapy to progress strength and functional motion in order to perform self care, work and household tasks as pt has difficulty reaching overhead, out to side and behind back at this time due to ROM restrictions. Pt also demonstrates limited scapular motion on R side with manual assist needed to allow for improved scapulohumeral movement. Pt demonstrates difficulty with upward scapular rotation without shoulder elevation.      Treatment/Activity Tolerance:  [x] Pt able to complete treatment [] Patient limited by fatique  [] Patient limited by pain  [] Patient limited by other medical complications  [] Other:     Prognosis: [x] Good [] Fair  [] Poor    Patient Requires Follow-up: [x] Yes  [] No    Return to Play: [x]  N/A   []  Stage 1: Intro to Strength   []  Stage 2: Dynamic Strength and Intro to Plyometrics   []  Stage 3: Advanced Plyometrics and Intro to Throwing   []  Stage 4: Sport specific Training/Return to Sport   []  Ready to Return to Play, [employer]   []  Not Ready for Return to Sports    Comments:      PLAN: See eval. PT 1-2x / week for 6-8 weeks. [x] Continue per plan of care [] Alter current plan (see comments)  [] Plan of care initiated [] Hold pending MD visit [] Discharge    Electronically signed by: Raza Shah PTA, [id number]    Note: If patient does not return for scheduled/ recommended follow up visits, this note will serve as a discharge from care along with most recent update on progress.

## 2020-11-25 ENCOUNTER — APPOINTMENT (OUTPATIENT)
Dept: PHYSICAL THERAPY | Age: 60
End: 2020-11-25
Payer: COMMERCIAL

## 2020-11-30 ENCOUNTER — HOSPITAL ENCOUNTER (OUTPATIENT)
Dept: PHYSICAL THERAPY | Age: 60
Setting detail: THERAPIES SERIES
Discharge: HOME | End: 2020-11-30
Payer: COMMERCIAL

## 2020-11-30 PROCEDURE — 97140 MANUAL THERAPY 1/> REGIONS: CPT

## 2020-11-30 PROCEDURE — 97110 THERAPEUTIC EXERCISES: CPT

## 2020-11-30 NOTE — FLOWSHEET NOTE
[facility]  Phone: [phone]   Fax: [phone]    Physical Therapy Treatment Note/ Progress Report:     Date:  2020    Patient Name:  Alejandrina Gabriel    :  1960  MRN: [id number]  Restrictions/Precautions:    Medical/Treatment Diagnosis Information:  Diagnosis: M25.512, G89.29 (ICD-10-CM) - Chronic left shoulder pain  Treatment Diagnosis: L shoulder and cervical decreased ROM, decreased strength, increased pain  Insurance/Certification information:  PT Insurance Information: [employer]  Physician Information:  Referring Practitioner: Dr. Schuler Foot of care signed (Y/N): [x]  Yes []  No     Date of Patient follow up with Physician:      Progress Report: []  Yes  [x]  No     Date Range for reporting period:  Beginning: 10/28/20  Ending:     Progress report due (10 Rx/or 30 days whichever is less): visit #10 or 20 (date) NPV     Recertification due (POC duration/ or 90 days whichever is less): visit #16 or 20 (date)     Visit # Insurance Allowable Auth required? Date Range    []  Yes  [x]  No n/a     Latex Allergy:  [x]NO      []YES  Preferred Language for Healthcare:   [x]English       []other:    Functional Scale:        Date assessed:  Quick DASH: raw score = 27; dysfunction = 36%  10/28/20     Pain level:  6/10     SUBJECTIVE:  Pt reports a couple days ago having a \"pop\" in shoulder with almost instant relief of some pain and with increased ROM but has been sore since incident.       OBJECTIVE: See eval   Observation:    Test measurements:    2020: AROM L shoulder: IR: L5      RESTRICTIONS/PRECAUTIONS: None    Exercises/Interventions:   Therapeutic Exercise (89808) Resistance / level Sets / Seconds  Reps Notes/Cues   pulleys Flex, abd 10\" 15 Added    Supine cane flexion  10\" 10    Upper trap stretch  10\" 10 Porfirio    Table slides  10\" 10    Porfirio ER orange 2 10    Pendulums - M/L, A/P, CW, CCW 2# 1 3    ER ranger @70abd 10\" 10 Added 11/4   Supine abduction w/ wand  10\" 10 Added 11/30   Scapular retraction blue 2 10 added 11/12   ^11/30   Shoulder extension blue 2 10 Added 11/12   ^11/30    theraband IR green 2 10 Added 11/30   theraband ER orange 2 10 Added 11/30   Doorway pec stretch Hands down 20\" 3 Added 11/12   IR towel stretch  10\" 10 Added 11/12   CBA stretch   10\" 10x  Added 11/18   @ CC supports    Prone rows  2 10 Added 11/30   Cueing to limit shoulder elevation   Prone extension       sidelying ER  2 10 Added 11/30                        Therapeutic Activities (65273)                                          Neuromuscular Re-ed (51867)            Ball Roll  1 10  CW/CCW  UP/DOWN  SIDE/SIDE    SL ABD    1 15 Clinician assist on scap to improve upward rotation and depression    Serratus punches   2 10 Added 11/30 - cueing for improved control          Manual Intervention ([id number])       Shld /GH Mobs GII/III posterior & inferior 5'  Added 11/4   Post Cap mobs                       STM  Ant Capsule  3'      Thoracic/Rib manipulation       PROM MT All directions 10'  Added 11/4 11/18- IR/ER, Bicep                Pt. Education:  -all pt questions were answered    Home Exercise Program:  Access Code: [email or web address]   URL: [email or web address]   Date: 10/28/2020   Prepared by: Mercedes Safer     Exercises   Supine Chin Tuck - 10 reps - 3 sets - 1x daily - 7x weekly   Supine Shoulder Flexion with Dowel - 10 reps - 10\" hold - 1x daily - 7x weekly   Seated Gentle Upper Trapezius Stretch - 5 reps - 20\" hold - 1x daily - 7x weekly   Standing Shoulder External Rotation with Resistance - 10 reps - 3 sets - 1x daily - 7x weekly   Seated Shoulder Flexion Towel Slide at Table Top - 10 reps - 10 sec hold - 1x daily - 7x weekly       Therapeutic Exercise and NMR EXR  [x] (51569) Provided verbal/tactile cueing for activities related to strengthening, flexibility, endurance, ROM  for improvements in scapular, scapulothoracic swelling/inflammation/restriction, improving soft tissue extensibility and allowing for proper ROM for normal function with self care, reaching, carrying, lifting, house/yardwork, driving/computer work    Modalities:  CP x 10'     Charges:  Timed Code Treatment Minutes: 45   Total Treatment Minutes: 45       [] EVAL - LOW (27592)   [] EVAL - MOD (50124)  [] EVAL - HIGH (99419)  [] RE-EVAL (28918)  [x] OZ(59346) x 2       [] Ionto  [] NMR (46840) x       [] Vaso  [x] Manual (27688) x1      [] Ultrasound  [] TA x        [] Mech Traction (08195)  [] Aquatic Therapy x      [] ES (un) (32545):   [] Home Management Training x  [] ES(attended) (49027)   [] Dry Needling 1-2 muscles (72895):  [] Dry Needling 3+ muscles (980566  [] Group:      [] Other:                    GOALS:  Patient stated goal: to get back to doing everything  [] Progressing: [] Met: [] Not Met: [] Adjusted    Therapist goals for Patient:   Short Term Goals: To be achieved in: 2 weeks  1. Independent in HEP and progression per patient tolerance, in order to prevent re-injury. [] Progressing: [] Met: [] Not Met: [] Adjusted  2. Patient will have a decrease in pain to facilitate improvement in movement, function, and ADLs as indicated by Functional Deficits. [] Progressing: [] Met: [] Not Met: [] Adjusted    Long Term Goals: To be achieved in: 6-8 weeks  1. Disability index score of 17% or less for the UEFI or Quick DASH to assist with reaching prior level of function. [] Progressing: [] Met: [] Not Met: [] Adjusted  2. Patient will demonstrate increased AROM to L shoulder flexion 150*, ABD to 150*, ER to 70*, IR to 40* to allow for proper joint functioning as indicated by patients Functional Deficits. [] Progressing: [] Met: [] Not Met: [] Adjusted  3. Patient will demonstrate an increase in Strength to 5/5 all shoulder musculature to allow for proper functional mobility as indicated by patients Functional Deficits.    [] Progressing: [] Met: [] Not Met: [] Adjusted  4. Patient will return to functional activities including driving and cooking without increased symptoms or restriction. [] Progressing: [] Met: [] Not Met: [] Adjusted  5. Patient will return to household management and work duties without increased pain or restriction. [] Progressing: [] Met: [] Not Met: [] Adjusted    Overall Progression Towards Functional goals/ Treatment Progress Update:  [] Patient is progressing as expected towards functional goals listed. [] Progression is slowed due to complexities/Impairments listed. [] Progression has been slowed due to co-morbidities. [x] Plan just implemented, too soon to assess goals progression <30days   [] Goals require adjustment due to lack of progress  [] Patient is not progressing as expected and requires additional follow up with physician  [] Other    Persisting Functional Limitations/Impairments:  []Sitting []Standing   []Transfers  [x]Sleeping   [x]Reaching [x]Lifting   [x]ADLs [x]Housework  [x]Driving [x]Job related tasks  [x]Sports/Recreation []Other:    ASSESSMENT:  Pt demonstrates improved passive flexion and abduction as well as improved ER with greatest restriction this date passively noted with abduction and IR motions. Upgraded exercises as noted in bold on above flow sheet to focus on improving motion and periscapular strength. Upgraded exercises as noted in bold on above flow sheet for focus on improving RTC and periscapular function to progress toward improved strength and function. Pt performed exercises with report of muscle fatigue and soreness but no increased pain. Continue to upgrade exercises as tolerated and focus on manual therapy for improved shoulder capsule motion for multidirectional reaching and daily tasks without restrictions.      Treatment/Activity Tolerance:  [x] Pt able to complete treatment [] Patient limited by fatique  [] Patient limited by pain  [] Patient limited by other medical complications  [] Other:     Prognosis: [x] Good [] Fair  [] Poor    Patient Requires Follow-up: [x] Yes  [] No    Return to Play:    [x]  N/A   []  Stage 1: Intro to Strength   []  Stage 2: Dynamic Strength and Intro to Plyometrics   []  Stage 3: Advanced Plyometrics and Intro to Throwing   []  Stage 4: Sport specific Training/Return to Sport   []  Ready to Return to Play, [employer]   []  Not Ready for Return to Sports    Comments:      PLAN: See eval. PT 1-2x / week for 6-8 weeks. [x] Continue per plan of care [] Alter current plan (see comments)  [] Plan of care initiated [] Hold pending MD visit [] Discharge    Electronically signed by: Tresa Shaffer PTA, [id number]    Note: If patient does not return for scheduled/ recommended follow up visits, this note will serve as a discharge from care along with most recent update on progress.

## 2020-12-04 ENCOUNTER — HOSPITAL ENCOUNTER (OUTPATIENT)
Dept: PHYSICAL THERAPY | Age: 60
Setting detail: THERAPIES SERIES
Discharge: HOME | End: 2020-12-04
Payer: COMMERCIAL

## 2020-12-04 PROCEDURE — 97140 MANUAL THERAPY 1/> REGIONS: CPT | Performed by: PHYSICAL THERAPIST

## 2020-12-04 PROCEDURE — 97110 THERAPEUTIC EXERCISES: CPT | Performed by: PHYSICAL THERAPIST

## 2020-12-04 NOTE — FLOWSHEET NOTE
[facility]  Phone: [phone]   Fax: [phone]    Physical Therapy Treatment Note/ Progress Report:     Date:  2020    Patient Name:  Cindy Garcia    :  1960  MRN: [id number]  Restrictions/Precautions:    Medical/Treatment Diagnosis Information:  Diagnosis: M25.512, G89.29 (ICD-10-CM) - Chronic left shoulder pain  Treatment Diagnosis: L shoulder and cervical decreased ROM, decreased strength, increased pain  Insurance/Certification information:  PT Insurance Information: [employer]arity  Physician Information:  Referring Practitioner: Dr. Edmundo Graham of care signed (Y/N): [x]  Yes []  No     Date of Patient follow up with Physician:      Progress Report: [x]  Yes  []  No     Date Range for reporting period:  Beginning: 10/28/20  Endin20    Progress report due (10 Rx/or 30 days whichever is less): visit #17 or 7/3/4036     Recertification due (POC duration/ or 90 days whichever is less): visit #16 or 20 (date)     Visit # Insurance Allowable Auth required? Date Range    []  Yes  [x]  No n/a     Latex Allergy:  [x]NO      []YES  Preferred Language for Healthcare:   [x]English       []other:    Functional Scale:        Date assessed:  Quick DASH: raw score = 24; dysfunction = 30%  20  Quick DASH: raw score = 27; dysfunction = 36%  10/28/20     Pain level:  6/10     SUBJECTIVE:  Pt. Reports that she feels that she has made a little progress but continues to have pain. Pt. Feels that she is doing better with work and with daily activities. Pt. Notes that she has some difficulty sleeping but feels that this has improved.      OBJECTIVE:     PROM AROM     L R L R   Shoulder Flexion  135 127   Shoulder Abduction  108 100   Shoulder External Rotation  50 @90abd     Shoulder Internal Rotation  ~25 @90abd ~L1 pain         Strength (0-5) Left Right    Shoulder Flex 4 pain   Shoulder Abd (C5) 4- pain   Shoulder ER 4- pain   Shoulder IR 4+ pain   Biceps (C6) 4+   Triceps (C7)  4                   RESTRICTIONS/PRECAUTIONS: None    Exercises/Interventions:   Therapeutic Exercise (61132) Resistance / level Sets / Seconds  Reps Notes/Cues   pulleys Flex, abd 10\" 15 Added 11/4   Supine cane flexion     Upper trap stretch  10\" 10 Porfirio    Table slides  10\" 10    Porfirio ER lime 2 10    Pendulums - M/L, A/P, CW, CCW    ER ranger @70abd 10\" 10 Added 11/4   Supine abduction w/ wand  Added 11/30   Scapular retraction added 11/12   ^11/30   Shoulder extension Added 11/12   ^11/30    theraband IR green 2 10 Added 11/30   theraband ER orange 2 10 Added 11/30   Doorway pec stretch Hands up 20\" 3 Added 11/12   IR towel stretch  10\" 10 Added 11/12   CBA stretch   10\" 10x  Added 11/18     Prone rows 2# 2 10 Added 11/30   Cueing to limit shoulder elevation   Prone extension 2# 2 10    Prone horizontal abd 0# 2 10 Tactile cueing for scapular facilitation   sidelying ER 2# 2 10 Added 11/30                        Therapeutic Activities (40397)                                          Neuromuscular Re-ed (39597)       Chin tucks - seated  5\" 10      Ball Roll  1 10  CW/CCW  UP/DOWN  SIDE/SIDE    SL ABD    1 15 Clinician assist on scap to improve upward rotation and depression    Serratus punches  2# 2 10 Added 11/30 - cueing for improved control          Manual Intervention (01968)       Shld /GH Mobs GII/III posterior & inferior 3'  Added 11/4   Post Cap mobs                       STM      Thoracic/Rib manipulation       PROM MT All directions 10'                Pt. Education:  -all pt questions were answered    Home Exercise Program:  Access Code: [email or web address]   URL: [email or web address]   Date: 10/28/2020   Prepared by: Pranav Quinones     Exercises   Supine Chin Tuck - 10 reps - 3 sets - 1x daily - 7x weekly   Supine Shoulder Flexion with Dowel - 10 reps - 10\" hold - 1x daily - 7x weekly   Seated Gentle Upper Trapezius Stretch - 5 reps - 20\" hold - 1x daily - 7x weekly   Standing Shoulder External Rotation with Resistance - 10 reps - 3 sets - 1x daily - 7x weekly   Seated Shoulder Flexion Towel Slide at Table Top - 10 reps - 10 sec hold - 1x daily - 7x weekly       Therapeutic Exercise and NMR EXR  [x] (26780) Provided verbal/tactile cueing for activities related to strengthening, flexibility, endurance, ROM  for improvements in scapular, scapulothoracic and UE control with self care, reaching, carrying, lifting, house/yardwork, driving/computer work.    [] (84903) Provided verbal/tactile cueing for activities related to improving balance, coordination, kinesthetic sense, posture, motor skill, proprioception  to assist with  scapular, scapulothoracic and UE control with self care, reaching, carrying, lifting, house/yardwork, driving/computer work.  [] (26867) Therapist is in constant attendance of 2 or more patients providing skilled therapy interventions, but not providing any significant amount of measurable one-on-one time to either patient, for improvements in cervical, scapular, scapulothoracic and UE control with self care, reaching, carrying, lifting, house/yardwork, driving, computer work. Therapeutic Activities:    [] (98500 or 98588) Provided verbal/tactile cueing for activities related to improving balance, coordination, kinesthetic sense, posture, motor skill, proprioception and motor activation to allow for proper function of scapular, scapulothoracic and UE control with self care, carrying, lifting, driving/computer work.      Home Exercise Program:    [x] (80997) Reviewed/Progressed HEP activities related to strengthening, flexibility, endurance, ROM of scapular, scapulothoracic and UE control with self care, reaching, carrying, lifting, house/yardwork, driving/computer work  [] (88151) Reviewed/Progressed HEP activities related to improving balance, coordination, kinesthetic sense, posture, motor skill, proprioception of scapular, demonstrate increased AROM to L shoulder flexion 150*, ABD to 150*, ER to 70*, IR to 40* to allow for proper joint functioning as indicated by patients Functional Deficits. [x] Progressing: [] Met: [] Not Met: [] Adjusted  3. Patient will demonstrate an increase in Strength to 5/5 all shoulder musculature to allow for proper functional mobility as indicated by patients Functional Deficits. [x] Progressing: [] Met: [] Not Met: [] Adjusted  4. Patient will return to functional activities including driving and cooking without increased symptoms or restriction. [x] Progressing: [] Met: [] Not Met: [] Adjusted  5. Patient will return to household management and work duties without increased pain or restriction. [x] Progressing: [] Met: [] Not Met: [] Adjusted    Overall Progression Towards Functional goals/ Treatment Progress Update:  [] Patient is progressing as expected towards functional goals listed. [x] Progression is slowed due to complexities/Impairments listed. [] Progression has been slowed due to co-morbidities. [] Plan just implemented, too soon to assess goals progression <30days   [] Goals require adjustment due to lack of progress  [] Patient is not progressing as expected and requires additional follow up with physician  [] Other    Persisting Functional Limitations/Impairments:  []Sitting []Standing   []Transfers  [x]Sleeping   [x]Reaching [x]Lifting   [x]ADLs [x]Housework  [x]Driving [x]Job related tasks  [x]Sports/Recreation []Other:    ASSESSMENT:  Pt. Tolerated therapy today without complaints. Pt. Continues to progress slowly in therapy due to work requirements and significant hypomobility. Noted increased in shoulder passive and active motion, greatest deficits persisting in rotational movements. Pt. continues to requires cueing throughout for timing and control with exercises. Scapular facilitation with prone exercises.  Pt. Will continue to benefit from skilled therapy in order to restore functional mobility and strength of shoulder and to decrease pain. Treatment/Activity Tolerance:  [x] Pt able to complete treatment [] Patient limited by fatique  [] Patient limited by pain  [] Patient limited by other medical complications  [] Other:     Prognosis: [x] Good [] Fair  [] Poor    Patient Requires Follow-up: [x] Yes  [] No    Return to Play:    [x]  N/A   []  Stage 1: Intro to Strength   []  Stage 2: Dynamic Strength and Intro to Plyometrics   []  Stage 3: Advanced Plyometrics and Intro to Throwing   []  Stage 4: Sport specific Training/Return to Sport   []  Ready to Return to Play, [employer]   []  Not Ready for Return to Sports    Comments:      PLAN: See eval. PT 1-2x / week for 6-8 weeks. [x] Continue per plan of care [] Alter current plan (see comments)  [] Plan of care initiated [] Hold pending MD visit [] Discharge    Electronically signed by: Oumar Arredondo PT    Note: If patient does not return for scheduled/ recommended follow up visits, this note will serve as a discharge from care along with most recent update on progress.

## 2020-12-14 ENCOUNTER — HOSPITAL ENCOUNTER (OUTPATIENT)
Dept: PHYSICAL THERAPY | Age: 60
Setting detail: THERAPIES SERIES
Discharge: HOME | End: 2020-12-14
Payer: COMMERCIAL

## 2020-12-14 PROCEDURE — 97110 THERAPEUTIC EXERCISES: CPT

## 2020-12-14 PROCEDURE — 97140 MANUAL THERAPY 1/> REGIONS: CPT

## 2020-12-14 NOTE — FLOWSHEET NOTE
Lucio Raygoza  Phone: [phone]   Fax: [phone]    Physical Therapy Treatment Note/ Progress Report:     Date:  2020    Patient Name:  Colette Pineda    :  1960  MRN: [id number]  Restrictions/Precautions:    Medical/Treatment Diagnosis Information:  Diagnosis: M25.512, G89.29 (ICD-10-CM) - Chronic left shoulder pain  Treatment Diagnosis: L shoulder and cervical decreased ROM, decreased strength, increased pain  Insurance/Certification information:  PT Insurance Information: [employer]  Physician Information:  Referring Practitioner: Dr. Franco Glover of care signed (Y/N): [x]  Yes []  No     Date of Patient follow up with Physician:      Progress Report: []  Yes  [x]  No     Date Range for reporting period:  Beginning: 10/28/20  Endin20    Progress report due (10 Rx/or 30 days whichever is less): visit #17 or 1380     Recertification due (POC duration/ or 90 days whichever is less): visit #16 or 20 (date)     Visit # Insurance Allowable Auth required? Date Range   8 20 []  Yes  [x]  No n/a     Latex Allergy:  [x]NO      []YES  Preferred Language for Healthcare:   [x]English       []other:    Functional Scale:        Date assessed:  Quick DASH: raw score = 24; dysfunction = 30%  20  Quick DASH: raw score = 27; dysfunction = 36%  10/28/20     Pain level:  7/10     SUBJECTIVE:  Denies significant change in function/pain since last week. Feels like she might be \"getting it stretched out more but the pain is still there\".      OBJECTIVE:     PROM AROM     L R L R   Shoulder Flexion  135 127   Shoulder Abduction  108 100   Shoulder External Rotation  50 @90abd     Shoulder Internal Rotation  ~25 @90abd ~L1 pain         Strength (0-5) Left Right    Shoulder Flex 4 pain   Shoulder Abd (C5) 4- pain   Shoulder ER 4- pain   Shoulder IR 4+ pain   Biceps (C6) 4+   Triceps (C7)  4           RESTRICTIONS/PRECAUTIONS: None    Exercises/Interventions:   Therapeutic Exercise (15313) Resistance / level Sets / Seconds  Reps Notes/Cues   pulleys Flex, abd 10\" 15 Added 11/4        Upper trap stretch  10\" 10 Porfirio    Wall climbs  10\" 10 Added 12/14      Porfirio ER lime 2 10        ER ranger @70abd 10\" 10 Added 11/4     Added 11/30   Scapular retraction blue 2 10 added 11/12   ^11/30   Shoulder extension blue 2 10 Added 11/12   ^11/30    theraband IR green 2 10 Added 11/30   theraband ER orange 2 10 Added 11/30   Doorway pec stretch Hands up 20\" 3 Added 11/12   IR towel stretch  10\" 10 Added 11/12   CBA stretch   10\" 10x  Added 11/18     Prone rows 2# 2 10 Added 11/30   Cueing to limit shoulder elevation   Prone extension 2# 2 10    Prone horizontal abd 0# 2 10 Tactile cueing for scapular facilitation   sidelying ER 2# 2 10 Added 11/30   Sleeper stretch npv                    Therapeutic Activities (56943)                                          Neuromuscular Re-ed (64249)             CW/CCW  UP/DOWN  SIDE/SIDE    SL ABD    1 15 Clinician assist on scap to improve upward rotation and depression    Serratus punches  2# 2 10 Added 11/30 - cueing for improved control   Wall pushups  2 10 Added 12/14          Manual Intervention (56762)       Shld /GH Mobs GII/III posterior & inferior 3'  Added 11/4   Post Cap mobs                       STM      Thoracic/Rib manipulation       PROM MT All directions 10'                Pt. Education:  -all pt questions were answered    Home Exercise Program:  Access Code: [email or web address]   URL: [email or web address]   Date: 10/28/2020   Prepared by: Manuel Morris     Exercises   Supine Chin Tuck - 10 reps - 3 sets - 1x daily - 7x weekly   Supine Shoulder Flexion with Dowel - 10 reps - 10\" hold - 1x daily - 7x weekly   Seated Gentle Upper Trapezius Stretch - 5 reps - 20\" hold - 1x daily - 7x weekly   Standing Shoulder External Rotation with Resistance - 10 reps - 3 sets - 1x daily - 7x weekly   Seated Shoulder Flexion Towel Slide at Table Top - 10 reps - 10 sec hold - 1x daily - 7x weekly       Therapeutic Exercise and NMR EXR  [x] (27246) Provided verbal/tactile cueing for activities related to strengthening, flexibility, endurance, ROM  for improvements in scapular, scapulothoracic and UE control with self care, reaching, carrying, lifting, house/yardwork, driving/computer work.    [] (59773) Provided verbal/tactile cueing for activities related to improving balance, coordination, kinesthetic sense, posture, motor skill, proprioception  to assist with  scapular, scapulothoracic and UE control with self care, reaching, carrying, lifting, house/yardwork, driving/computer work.  [] (80444) Therapist is in constant attendance of 2 or more patients providing skilled therapy interventions, but not providing any significant amount of measurable one-on-one time to either patient, for improvements in cervical, scapular, scapulothoracic and UE control with self care, reaching, carrying, lifting, house/yardwork, driving, computer work. Therapeutic Activities:    [] (71557 or 47851) Provided verbal/tactile cueing for activities related to improving balance, coordination, kinesthetic sense, posture, motor skill, proprioception and motor activation to allow for proper function of scapular, scapulothoracic and UE control with self care, carrying, lifting, driving/computer work.      Home Exercise Program:    [x] (31499) Reviewed/Progressed HEP activities related to strengthening, flexibility, endurance, ROM of scapular, scapulothoracic and UE control with self care, reaching, carrying, lifting, house/yardwork, driving/computer work  [] (37207) Reviewed/Progressed HEP activities related to improving balance, coordination, kinesthetic sense, posture, motor skill, proprioception of scapular, scapulothoracic and UE control with self care, reaching, carrying, lifting, house/yardwork, driving/computer work Manual Treatments:  PROM / STM / Oscillations-Mobs:  G-I, II, III, IV (PA's, Inf., Post.)  [x] (32653) Provided manual therapy to mobilize soft tissue/joints of cervical/CT, scapular GHJ and UE for the purpose of modulating pain, promoting relaxation,  increasing ROM, reducing/eliminating soft tissue swelling/inflammation/restriction, improving soft tissue extensibility and allowing for proper ROM for normal function with self care, reaching, carrying, lifting, house/yardwork, driving/computer work    Modalities:  CP x 10'     Charges:  Timed Code Treatment Minutes: 45   Total Treatment Minutes: 55       [] EVAL - LOW (36086)   [] EVAL - MOD (49876)  [] EVAL - HIGH (59419)  [] RE-EVAL (48513)  [x] IG(77994) x 2       [] Ionto  [] NMR (66510) x       [] Vaso  [x] Manual (23229) x1      [] Ultrasound   [] TA x        [] Mech Traction (38306)  [] Aquatic Therapy x      [] ES (un) (61249):   [] Home Management Training x  [] ES(attended) (07297)   [] Dry Needling 1-2 muscles (19343):  [] Dry Needling 3+ muscles (886555  [] Group:      [] Other:                    GOALS:  Patient stated goal: to get back to doing everything  [x] Progressing: [] Met: [] Not Met: [] Adjusted    Therapist goals for Patient:   Short Term Goals: To be achieved in: 2 weeks  1. Independent in HEP and progression per patient tolerance, in order to prevent re-injury. [] Progressing: [x] Met: [] Not Met: [] Adjusted  2. Patient will have a decrease in pain to facilitate improvement in movement, function, and ADLs as indicated by Functional Deficits. [x] Progressing: [] Met: [] Not Met: [] Adjusted    Long Term Goals: To be achieved in: 6-8 weeks  1. Disability index score of 17% or less for the UEFI or Quick DASH to assist with reaching prior level of function. [x] Progressing: [] Met: [] Not Met: [] Adjusted  2.  Patient will demonstrate increased AROM to L shoulder flexion 150*, ABD to 150*, ER to 70*, IR to 40* to allow for proper joint functioning as indicated by patients Functional Deficits. [x] Progressing: [] Met: [] Not Met: [] Adjusted  3. Patient will demonstrate an increase in Strength to 5/5 all shoulder musculature to allow for proper functional mobility as indicated by patients Functional Deficits. [x] Progressing: [] Met: [] Not Met: [] Adjusted  4. Patient will return to functional activities including driving and cooking without increased symptoms or restriction. [x] Progressing: [] Met: [] Not Met: [] Adjusted  5. Patient will return to household management and work duties without increased pain or restriction. [x] Progressing: [] Met: [] Not Met: [] Adjusted    Overall Progression Towards Functional goals/ Treatment Progress Update:  [] Patient is progressing as expected towards functional goals listed. [x] Progression is slowed due to complexities/Impairments listed. [] Progression has been slowed due to co-morbidities. [] Plan just implemented, too soon to assess goals progression <30days   [] Goals require adjustment due to lack of progress  [] Patient is not progressing as expected and requires additional follow up with physician  [] Other    Persisting Functional Limitations/Impairments:  []Sitting []Standing   []Transfers  [x]Sleeping   [x]Reaching [x]Lifting   [x]ADLs [x]Housework  [x]Driving [x]Job related tasks  [x]Sports/Recreation []Other:    ASSESSMENT:  Pt continues to demonstrate improvements in passive motion with exception of IR which remains significantly limited. Pt continues to demonstrate periscapular weakness with exercises with cueing needed to focus on form and mechanics as well as control. Continue to work on improving strength, function and active motion in order to perform work related, daily household and self care tasks without restrictions. Pt continues to struggle with active motion overhead and reaching behind back due to active ROM limits and strength deficits at this time. Treatment/Activity Tolerance:  [x] Pt able to complete treatment [] Patient limited by fatique  [] Patient limited by pain  [] Patient limited by other medical complications  [] Other:     Prognosis: [x] Good [] Fair  [] Poor    Patient Requires Follow-up: [x] Yes  [] No    Return to Play:    [x]  N/A   []  Stage 1: Intro to Strength   []  Stage 2: Dynamic Strength and Intro to Plyometrics   []  Stage 3: Advanced Plyometrics and Intro to Throwing   []  Stage 4: Sport specific Training/Return to Sport   []  Ready to Return to Play, [employer]   []  Not Ready for Return to Sports    Comments:      PLAN: See eval. PT 1-2x / week for 6-8 weeks. [x] Continue per plan of care [] Alter current plan (see comments)  [] Plan of care initiated [] Hold pending MD visit [] Discharge    Electronically signed by: Edison Muniz    Note: If patient does not return for scheduled/ recommended follow up visits, this note will serve as a discharge from care along with most recent update on progress.

## 2020-12-17 ENCOUNTER — HOSPITAL ENCOUNTER (OUTPATIENT)
Dept: PHYSICAL THERAPY | Age: 60
Setting detail: THERAPIES SERIES
Discharge: HOME | End: 2020-12-17
Payer: COMMERCIAL

## 2020-12-17 PROCEDURE — 97140 MANUAL THERAPY 1/> REGIONS: CPT | Performed by: PHYSICAL THERAPIST

## 2020-12-17 PROCEDURE — 97112 NEUROMUSCULAR REEDUCATION: CPT | Performed by: PHYSICAL THERAPIST

## 2020-12-17 PROCEDURE — 97110 THERAPEUTIC EXERCISES: CPT | Performed by: PHYSICAL THERAPIST

## 2020-12-17 NOTE — FLOWSHEET NOTE
[facility]  Phone: [phone]   Fax: [phone]    Physical Therapy Treatment Note/ Progress Report:     Date:  2020    Patient Name:  Michelle Hylton    :  1960  MRN: [id number]  Restrictions/Precautions:    Medical/Treatment Diagnosis Information:  Diagnosis: M25.512, G89.29 (ICD-10-CM) - Chronic left shoulder pain  Treatment Diagnosis: L shoulder and cervical decreased ROM, decreased strength, increased pain  Insurance/Certification information:  PT Insurance Information: Solidarity  Physician Information:  Referring Practitioner: Dr. Annalisa Colindres of care signed (Y/N): [x]  Yes []  No     Date of Patient follow up with Physician:      Progress Report: []  Yes  [x]  No     Date Range for reporting period:  Beginning: 10/28/20  Endin20    Progress report due (10 Rx/or 30 days whichever is less): visit #17 or 1069     Recertification due (POC duration/ or 90 days whichever is less): visit #16 or 20 (date)     Visit # Insurance Allowable Auth required? Date Range    []  Yes  [x]  No n/a     Latex Allergy:  [x]NO      []YES  Preferred Language for Healthcare:   [x]English       []other:    Functional Scale:        Date assessed:  Quick DASH: raw score = 24; dysfunction = 30%  20  Quick DASH: raw score = 27; dysfunction = 36%  10/28/20     Pain level:  5/10     SUBJECTIVE:  Pt. Reports that the pain/soreness in shoulder is about the same but she continues to improve in mobility. Pt. Notes that her shoulder does not stop her from doing anything but she continues to have some trouble sleeping.      OBJECTIVE:     PROM AROM     L R L R   Shoulder Flexion  135 127   Shoulder Abduction  108 100   Shoulder External Rotation  50 @90abd     Shoulder Internal Rotation  ~25 @90abd ~L1 pain         Strength (0-5) Left Right    Shoulder Flex 4 pain   Shoulder Abd (C5) 4- pain   Shoulder ER 4- pain   Shoulder IR 4+ pain   Biceps (C6) 4+   Triceps (C7)  4                   RESTRICTIONS/PRECAUTIONS: None    Exercises/Interventions:   Therapeutic Exercise (37639) Resistance / level Sets / Seconds  Reps Notes/Cues   pulleys Flex, abd 10\" 15 Added 11/4        Upper trap stretch  10\" 10 Porfirio    Added 12/14      Porfirio ER        ER ranger Added 11/4     Added 11/30   Scapular retraction added 11/12   ^11/30   Shoulder extension Added 11/12   ^11/30    theraband IR green 2 10 Added 11/30   theraband ER green 2 10 Added 11/30   Doorway pec stretch Hands up 20\" 3 Added 11/12   IR towel stretch  10\" 10 Added 11/12   CBA stretch   10\" 10x  Added 11/18     Prone rows 3# 2 10 Added 11/30   Cueing to limit shoulder elevation   Prone extension 3# 2 10    sidelying ER 3# 2 10 Cueing for full range   Sleeper stretch  10\" 10                  Therapeutic Activities (15823)                                          Neuromuscular Re-ed (01588)             CW/CCW  UP/DOWN  SIDE/SIDE    SL ABD   2# 1 15 Clinician assist on scap to improve upward rotation and depression    Serratus punches 5# 2 10 Added 11/30 - cueing for improved control   Wall pushups  2 10 Added 12/14   Spider walks orange 1 5    Body blade - at neutral (IR/ER, ant/post)   Medium black      Prone horizontal abd eccentric 1 10 Manual assist for full range, tactile facilitation          Manual Intervention (88174)       Shld /GH Mobs GII/III posterior & inferior 3'  Added 11/4   Post Cap mobs                     STM / trigger point release Teres/lat  subscap  6'      Thoracic/Rib manipulation       PROM MT All directions 5'                Pt. Education:  -all pt questions were answered    Home Exercise Program:  Access Code: [email or web address]   URL: [email or web address]   Date: 10/28/2020   Prepared by: Дмитрий Brown     Exercises   Supine Chin Tuck - 10 reps - 3 sets - 1x daily - 7x weekly   Supine Shoulder Flexion with Dowel - 10 reps - 10\" hold - 1x daily - 7x weekly   Seated Gentle Upper Trapezius Stretch - 5 reps - 20\" hold - 1x daily - 7x weekly   Standing Shoulder External Rotation with Resistance - 10 reps - 3 sets - 1x daily - 7x weekly   Seated Shoulder Flexion Towel Slide at Table Top - 10 reps - 10 sec hold - 1x daily - 7x weekly       Therapeutic Exercise and NMR EXR  [x] (66096) Provided verbal/tactile cueing for activities related to strengthening, flexibility, endurance, ROM  for improvements in scapular, scapulothoracic and UE control with self care, reaching, carrying, lifting, house/yardwork, driving/computer work.    [] (07787) Provided verbal/tactile cueing for activities related to improving balance, coordination, kinesthetic sense, posture, motor skill, proprioception  to assist with  scapular, scapulothoracic and UE control with self care, reaching, carrying, lifting, house/yardwork, driving/computer work.  [] (16357) Therapist is in constant attendance of 2 or more patients providing skilled therapy interventions, but not providing any significant amount of measurable one-on-one time to either patient, for improvements in cervical, scapular, scapulothoracic and UE control with self care, reaching, carrying, lifting, house/yardwork, driving, computer work. Therapeutic Activities:    [] (45322 or 55904) Provided verbal/tactile cueing for activities related to improving balance, coordination, kinesthetic sense, posture, motor skill, proprioception and motor activation to allow for proper function of scapular, scapulothoracic and UE control with self care, carrying, lifting, driving/computer work.      Home Exercise Program:    [x] (90947) Reviewed/Progressed HEP activities related to strengthening, flexibility, endurance, ROM of scapular, scapulothoracic and UE control with self care, reaching, carrying, lifting, house/yardwork, driving/computer work  [] (94045) Reviewed/Progressed HEP activities related to improving balance, coordination, kinesthetic sense, posture, motor skill, proprioception of scapular, scapulothoracic and UE control with self care, reaching, carrying, lifting, house/yardwork, driving/computer work      Manual Treatments:  PROM / STM / Oscillations-Mobs:  G-I, II, III, IV (PA's, Inf., Post.)  [x] (22364) Provided manual therapy to mobilize soft tissue/joints of cervical/CT, scapular GHJ and UE for the purpose of modulating pain, promoting relaxation,  increasing ROM, reducing/eliminating soft tissue swelling/inflammation/restriction, improving soft tissue extensibility and allowing for proper ROM for normal function with self care, reaching, carrying, lifting, house/yardwork, driving/computer work    Modalities:  CP x 10'     Charges:  Timed Code Treatment Minutes: 43   Total Treatment Minutes: 56       [] EVAL - LOW (35309)   [] EVAL - MOD (59451)  [] EVAL - HIGH (67679)  [] RE-EVAL (30224)   [x] XF(55978) x 1       [] Ionto  [x] NMR (51775) x 1      [] Vaso  [x] Manual (78357) x1      [] Ultrasound   [] TA x        [] Mech Traction (94601)  [] Aquatic Therapy x      [] ES (un) (85174):   [] Home Management Training x  [] ES(attended) (75151)   [] Dry Needling 1-2 muscles (61392):  [] Dry Needling 3+ muscles (913879  [] Group:      [] Other:                    GOALS:  Patient stated goal: to get back to doing everything  [x] Progressing: [] Met: [] Not Met: [] Adjusted    Therapist goals for Patient:   Short Term Goals: To be achieved in: 2 weeks  1. Independent in HEP and progression per patient tolerance, in order to prevent re-injury. [] Progressing: [x] Met: [] Not Met: [] Adjusted  2. Patient will have a decrease in pain to facilitate improvement in movement, function, and ADLs as indicated by Functional Deficits. [x] Progressing: [] Met: [] Not Met: [] Adjusted    Long Term Goals: To be achieved in: 6-8 weeks  1. Disability index score of 17% or less for the UEFI or Quick DASH to assist with reaching prior level of function.    [x] Progressing: [] Met: [] Not Met: [] Adjusted  2. Patient will demonstrate increased AROM to L shoulder flexion 150*, ABD to 150*, ER to 70*, IR to 40* to allow for proper joint functioning as indicated by patients Functional Deficits. [x] Progressing: [] Met: [] Not Met: [] Adjusted  3. Patient will demonstrate an increase in Strength to 5/5 all shoulder musculature to allow for proper functional mobility as indicated by patients Functional Deficits. [x] Progressing: [] Met: [] Not Met: [] Adjusted  4. Patient will return to functional activities including driving and cooking without increased symptoms or restriction. [x] Progressing: [] Met: [] Not Met: [] Adjusted  5. Patient will return to household management and work duties without increased pain or restriction. [x] Progressing: [] Met: [] Not Met: [] Adjusted    Overall Progression Towards Functional goals/ Treatment Progress Update:  [] Patient is progressing as expected towards functional goals listed. [x] Progression is slowed due to complexities/Impairments listed. [] Progression has been slowed due to co-morbidities. [] Plan just implemented, too soon to assess goals progression <30days   [] Goals require adjustment due to lack of progress  [] Patient is not progressing as expected and requires additional follow up with physician  [] Other    Persisting Functional Limitations/Impairments:  []Sitting []Standing   []Transfers  [x]Sleeping   [x]Reaching [x]Lifting   [x]ADLs [x]Housework  [x]Driving [x]Job related tasks  [x]Sports/Recreation []Other:    ASSESSMENT:   Pt. Tolerated therapy today with minimal complaints. Discussed with pt. Returning to referring MD due to persisting pain. Pt. Continues to require constant cueing for timing of exercises and control through movements. Tactile facilitation for correct scapular movement throughout therapy session. Able to increase some resistance activities with cueing to decrease compensations.  Additional STM and trigger point release performed in posterior shoulder due to persisting pain. Deficits persisting in shoulder ER and IR ROM. Pt. Will continue to benefit from skilled therapy to restore full motion and decrease pain. Treatment/Activity Tolerance:  [x] Pt able to complete treatment [] Patient limited by fatique  [] Patient limited by pain  [] Patient limited by other medical complications  [] Other:     Prognosis: [x] Good [] Fair  [] Poor    Patient Requires Follow-up: [x] Yes  [] No    Return to Play:    [x]  N/A   []  Stage 1: Intro to Strength   []  Stage 2: Dynamic Strength and Intro to Plyometrics   []  Stage 3: Advanced Plyometrics and Intro to Throwing   []  Stage 4: Sport specific Training/Return to Sport   []  Ready to Return to Play, [employer]   []  Not Ready for Return to Sports    Comments:      PLAN: See eval. PT 1-2x / week for 6-8 weeks. [x] Continue per plan of care [] Alter current plan (see comments)  [] Plan of care initiated [] Hold pending MD visit [] Discharge    Electronically signed by: Ishmael Santos PT    Note: If patient does not return for scheduled/ recommended follow up visits, this note will serve as a discharge from care along with most recent update on progress.

## 2020-12-21 ENCOUNTER — HOSPITAL ENCOUNTER (OUTPATIENT)
Dept: PHYSICAL THERAPY | Age: 60
Setting detail: THERAPIES SERIES
Discharge: HOME | End: 2020-12-21
Payer: COMMERCIAL

## 2020-12-21 PROCEDURE — 97140 MANUAL THERAPY 1/> REGIONS: CPT

## 2020-12-21 PROCEDURE — 97110 THERAPEUTIC EXERCISES: CPT

## 2020-12-21 PROCEDURE — 97112 NEUROMUSCULAR REEDUCATION: CPT

## 2020-12-21 NOTE — FLOWSHEET NOTE
None    Exercises/Interventions:   Therapeutic Exercise (83886) Resistance / level Sets / Seconds  Reps Notes/Cues   pulleys Flex, abd 10\" 15 Added 11/4        Upper trap stretch  10\" 10 Porfirio    Added 12/14      Porfirio ER        ER ranger Added 11/4     Added 11/30   Scapular retraction added 11/12   ^11/30   Shoulder extension Added 11/12   ^11/30    theraband IR green 2 10 Added 11/30   theraband ER green 2 10 Added 11/30   Doorway pec stretch Hands up 20\" 3 Added 11/12   IR towel stretch  10\" 10 Added 11/12   CBA stretch   10\" 10x  Added 11/18     Prone rows 3# 2 10 Added 11/30   Cueing to limit shoulder elevation   Prone extension 3# 2 10    sidelying ER 3# 2 10 Cueing for full range and proper technique   Sleeper stretch  10\" 10                  Therapeutic Activities (25874)                                          Neuromuscular Re-ed (70602)             CW/CCW  UP/DOWN  SIDE/SIDE    SL ABD   2# 1 15 Clinician assist on scap to improve upward rotation and depression    Serratus punches 5# 2 10 Added 11/30 - cueing for improved control   Wall pushups  2 10 Added 12/14   Spider walks orange 1 5    Body blade - at neutral (IR/ER, ant/post)   Medium black 20\" 3    Prone horizontal abd eccentric 1 10 Manual assist for full range, tactile facilitation          Manual Intervention (90321)       Shld /GH Mobs GII/III posterior & inferior 3'  Added 11/4          STM / trigger point release Teres/lat  subscap  6'      PROM MT All directions 5'                Pt. Education:  -all pt questions were answered    Home Exercise Program:  Access Code: [email or web address]   URL: [email or web address]   Date: 10/28/2020   Prepared by: Imtiaz Choi     Exercises   Supine Chin Tuck - 10 reps - 3 sets - 1x daily - 7x weekly   Supine Shoulder Flexion with Dowel - 10 reps - 10\" hold - 1x daily - 7x weekly   Seated Gentle Upper Trapezius Stretch - 5 reps - 20\" hold - 1x daily - 7x weekly   Standing Shoulder External Rotation with Resistance - 10 reps - 3 sets - 1x daily - 7x weekly   Seated Shoulder Flexion Towel Slide at Table Top - 10 reps - 10 sec hold - 1x daily - 7x weekly       Therapeutic Exercise and NMR EXR  [x] (23795) Provided verbal/tactile cueing for activities related to strengthening, flexibility, endurance, ROM  for improvements in scapular, scapulothoracic and UE control with self care, reaching, carrying, lifting, house/yardwork, driving/computer work.    [] (36125) Provided verbal/tactile cueing for activities related to improving balance, coordination, kinesthetic sense, posture, motor skill, proprioception  to assist with  scapular, scapulothoracic and UE control with self care, reaching, carrying, lifting, house/yardwork, driving/computer work.  [] (58583) Therapist is in constant attendance of 2 or more patients providing skilled therapy interventions, but not providing any significant amount of measurable one-on-one time to either patient, for improvements in cervical, scapular, scapulothoracic and UE control with self care, reaching, carrying, lifting, house/yardwork, driving, computer work. Therapeutic Activities:    [] (63228 or 15832) Provided verbal/tactile cueing for activities related to improving balance, coordination, kinesthetic sense, posture, motor skill, proprioception and motor activation to allow for proper function of scapular, scapulothoracic and UE control with self care, carrying, lifting, driving/computer work.      Home Exercise Program:    [x] (51328) Reviewed/Progressed HEP activities related to strengthening, flexibility, endurance, ROM of scapular, scapulothoracic and UE control with self care, reaching, carrying, lifting, house/yardwork, driving/computer work  [] (47565) Reviewed/Progressed HEP activities related to improving balance, coordination, kinesthetic sense, posture, motor skill, proprioception of scapular, scapulothoracic and UE control with self care, reaching, carrying, lifting, house/yardwork, driving/computer work      Manual Treatments:  PROM / STM / Oscillations-Mobs:  G-I, II, III, IV (PA's, Inf., Post.)  [x] (59736) Provided manual therapy to mobilize soft tissue/joints of cervical/CT, scapular GHJ and UE for the purpose of modulating pain, promoting relaxation,  increasing ROM, reducing/eliminating soft tissue swelling/inflammation/restriction, improving soft tissue extensibility and allowing for proper ROM for normal function with self care, reaching, carrying, lifting, house/yardwork, driving/computer work    Modalities:     Charges:  Timed Code Treatment Minutes: 45   Total Treatment Minutes: 48       [] EVAL - LOW (61735)   [] EVAL - MOD (62173)  [] EVAL - HIGH (09397)  [] RE-EVAL (97948)   [x] FK(92953) x 1       [] Ionto  [x] NMR (77833) x 1      [] Vaso  [x] Manual (31214) x1      [] Ultrasound   [] TA x        [] Mech Traction (88075)  [] Aquatic Therapy x      [] ES (un) (04537):   [] Home Management Training x  [] ES(attended) (82331)   [] Dry Needling 1-2 muscles (14600):  [] Dry Needling 3+ muscles (578380  [] Group:      [] Other:                    GOALS:  Patient stated goal: to get back to doing everything  [x] Progressing: [] Met: [] Not Met: [] Adjusted    Therapist goals for Patient:   Short Term Goals: To be achieved in: 2 weeks  1. Independent in HEP and progression per patient tolerance, in order to prevent re-injury. [] Progressing: [x] Met: [] Not Met: [] Adjusted  2. Patient will have a decrease in pain to facilitate improvement in movement, function, and ADLs as indicated by Functional Deficits. [x] Progressing: [] Met: [] Not Met: [] Adjusted    Long Term Goals: To be achieved in: 6-8 weeks  1. Disability index score of 17% or less for the UEFI or Quick DASH to assist with reaching prior level of function. [x] Progressing: [] Met: [] Not Met: [] Adjusted  2.  Patient will demonstrate increased AROM to L shoulder flexion 150*, ABD to 150*, ER to 70*, IR to 40* to allow for proper joint functioning as indicated by patients Functional Deficits. [x] Progressing: [] Met: [] Not Met: [] Adjusted  3. Patient will demonstrate an increase in Strength to 5/5 all shoulder musculature to allow for proper functional mobility as indicated by patients Functional Deficits. [x] Progressing: [] Met: [] Not Met: [] Adjusted  4. Patient will return to functional activities including driving and cooking without increased symptoms or restriction. [x] Progressing: [] Met: [] Not Met: [] Adjusted  5. Patient will return to household management and work duties without increased pain or restriction. [x] Progressing: [] Met: [] Not Met: [] Adjusted    Overall Progression Towards Functional goals/ Treatment Progress Update:  [] Patient is progressing as expected towards functional goals listed. [x] Progression is slowed due to complexities/Impairments listed. [] Progression has been slowed due to co-morbidities. [] Plan just implemented, too soon to assess goals progression <30days   [] Goals require adjustment due to lack of progress  [] Patient is not progressing as expected and requires additional follow up with physician  [] Other    Persisting Functional Limitations/Impairments:  []Sitting []Standing   []Transfers  [x]Sleeping   [x]Reaching [x]Lifting   [x]ADLs [x]Housework  [x]Driving [x]Job related tasks  [x]Sports/Recreation []Other:    ASSESSMENT:  Pt continues to have joint capsule tightness with manual stretching but with gradually improving passive motion. Restrictions continue with motion in all directions especially with IR/ER motions as well as muscle tightness throughout posterior shoulder region. Pt continues to need cueing for proper technique throughout routine with exercises with focus on control. Continue to work on improving strength, motion and function toward goals with improved ability to perform daily ADL's and work duty without restrictions. Treatment/Activity Tolerance:  [x] Pt able to complete treatment [] Patient limited by fatique  [] Patient limited by pain  [] Patient limited by other medical complications  [] Other:     Prognosis: [x] Good [] Fair  [] Poor    Patient Requires Follow-up: [x] Yes  [] No    Return to Play:    [x]  N/A   []  Stage 1: Intro to Strength   []  Stage 2: Dynamic Strength and Intro to Plyometrics   []  Stage 3: Advanced Plyometrics and Intro to Throwing   []  Stage 4: Sport specific Training/Return to Sport   []  Ready to Return to Play, [employer]   []  Not Ready for Return to Sports    Comments:      PLAN: See eval. PT 1-2x / week for 6-8 weeks. [x] Continue per plan of care [] Alter current plan (see comments)  [] Plan of care initiated [] Hold pending MD visit [] Discharge    Electronically signed by: Holly Mckeon    Note: If patient does not return for scheduled/ recommended follow up visits, this note will serve as a discharge from care along with most recent update on progress.

## 2020-12-24 ENCOUNTER — HOSPITAL ENCOUNTER (OUTPATIENT)
Dept: PHYSICAL THERAPY | Age: 60
Setting detail: THERAPIES SERIES
Discharge: HOME | End: 2020-12-24
Payer: COMMERCIAL

## 2020-12-24 PROCEDURE — 97110 THERAPEUTIC EXERCISES: CPT

## 2020-12-24 PROCEDURE — 97112 NEUROMUSCULAR REEDUCATION: CPT

## 2020-12-24 PROCEDURE — 97140 MANUAL THERAPY 1/> REGIONS: CPT

## 2020-12-24 NOTE — FLOWSHEET NOTE
Lucio Womack  Phone: [phone]   Fax: [phone]    Physical Therapy Treatment Note/ Progress Report:     Date:  2020    Patient Name:  Yeimy Marc    :  1960  MRN: [id number]  Restrictions/Precautions:    Medical/Treatment Diagnosis Information:  Diagnosis: M25.512, G89.29 (ICD-10-CM) - Chronic left shoulder pain  Treatment Diagnosis: L shoulder and cervical decreased ROM, decreased strength, increased pain  Insurance/Certification information:  PT Insurance Information: [employer]  Physician Information:  Referring Practitioner: Dr. Macy Reynoso of care signed (Y/N): [x]  Yes []  No     Date of Patient follow up with Physician:      Progress Report: []  Yes  [x]  No     Date Range for reporting period:  Beginning: 10/28/20  Endin20    Progress report due (10 Rx/or 30 days whichever is less): visit #17 or 980     Recertification due (POC duration/ or 90 days whichever is less): visit #16 or 20 (date)     Visit # Insurance Allowable Auth required? Date Range    []  Yes  [x]  No n/a     Latex Allergy:  [x]NO      []YES  Preferred Language for Healthcare:   [x]English       []other:    Functional Scale:        Date assessed:  Quick DASH: raw score = 24; dysfunction = 30%  20  Quick DASH: raw score = 27; dysfunction = 36%  10/28/20     Pain level:  4/10     SUBJECTIVE:  Shoulder still stiff and achy this morning. Still has difficulty reaching behind back.     OBJECTIVE:     PROM AROM     L R L R   Shoulder Flexion  135 127   Shoulder Abduction  108 100   Shoulder External Rotation  50 @90abd     Shoulder Internal Rotation  ~25 @90abd ~L1 pain         Strength (0-5) Left Right    Shoulder Flex 4 pain   Shoulder Abd (C5) 4- pain   Shoulder ER 4- pain   Shoulder IR 4+ pain   Biceps (C6) 4+   Triceps (C7)  4         RESTRICTIONS/PRECAUTIONS: None    Exercises/Interventions:   Therapeutic Exercise (18965) Resistance / level Sets / Seconds  Reps Notes/Cues   pulleys Flex, abd 10\" 15 Added 11/4        Upper trap stretch  10\" 10 Porfirio    Wall climbs  10\" 10 Added 12/14      Porfirio ER        ER ranger Added 11/4     Added 11/30   Scapular retraction added 11/12   ^11/30   Shoulder extension Added 11/12   ^11/30    theraband IR green 2 10 Added 11/30   theraband ER green 2 10 Added 11/30   Doorway pec stretch Hands up 20\" 3 Added 11/12   IR towel stretch  10\" 10 Added 11/12   CBA stretch   10\" 10x  Added 11/18     Prone rows 3# 2 10 Added 11/30   Cueing to limit shoulder elevation   Prone extension 3# 2 10    sidelying ER 3# 2 10 Cueing for full range and proper technique   Sleeper stretch  10\" 10                  Therapeutic Activities (89556)                                          Neuromuscular Re-ed (94615)             CW/CCW  UP/DOWN  SIDE/SIDE    SL ABD   2# 1 15 Clinician assist on scap to improve upward rotation and depression    Serratus punches 5# 2 10 Added 11/30 - cueing for improved control   Wall pushups  2 10 Added 12/14   Spider walks orange 1 5    Body blade - at neutral (IR/ER, ant/post)   Medium black 20\" 3    Prone horizontal abd eccentric 1 10 Manual assist for full range, tactile facilitation          Manual Intervention (24367)       Shld /GH Mobs GII/III posterior & inferior 3'  Added 11/4          STM / trigger point release Teres/lat  subscap  6'      PROM MT All directions 8'                Pt. Education:  -all pt questions were answered    Home Exercise Program:  Access Code: [email or web address]   URL: [email or web address]   Date: 10/28/2020   Prepared by: Misa Knowles     Exercises   Supine Chin Tuck - 10 reps - 3 sets - 1x daily - 7x weekly   Supine Shoulder Flexion with Dowel - 10 reps - 10\" hold - 1x daily - 7x weekly   Seated Gentle Upper Trapezius Stretch - 5 reps - 20\" hold - 1x daily - 7x weekly   Standing Shoulder External Rotation with Resistance - 10 reps - 3 sets - 1x daily - 7x weekly Seated Shoulder Flexion Towel Slide at Table Top - 10 reps - 10 sec hold - 1x daily - 7x weekly       Therapeutic Exercise and NMR EXR  [x] (14599) Provided verbal/tactile cueing for activities related to strengthening, flexibility, endurance, ROM  for improvements in scapular, scapulothoracic and UE control with self care, reaching, carrying, lifting, house/yardwork, driving/computer work.    [] (65057) Provided verbal/tactile cueing for activities related to improving balance, coordination, kinesthetic sense, posture, motor skill, proprioception  to assist with  scapular, scapulothoracic and UE control with self care, reaching, carrying, lifting, house/yardwork, driving/computer work.  [] (60545) Therapist is in constant attendance of 2 or more patients providing skilled therapy interventions, but not providing any significant amount of measurable one-on-one time to either patient, for improvements in cervical, scapular, scapulothoracic and UE control with self care, reaching, carrying, lifting, house/yardwork, driving, computer work. Therapeutic Activities:    [] (62328 or 92944) Provided verbal/tactile cueing for activities related to improving balance, coordination, kinesthetic sense, posture, motor skill, proprioception and motor activation to allow for proper function of scapular, scapulothoracic and UE control with self care, carrying, lifting, driving/computer work.      Home Exercise Program:    [x] (92364) Reviewed/Progressed HEP activities related to strengthening, flexibility, endurance, ROM of scapular, scapulothoracic and UE control with self care, reaching, carrying, lifting, house/yardwork, driving/computer work  [] (93391) Reviewed/Progressed HEP activities related to improving balance, coordination, kinesthetic sense, posture, motor skill, proprioception of scapular, scapulothoracic and UE control with self care, reaching, carrying, lifting, house/yardwork, driving/computer work      Manual Treatments:  PROM / STM / Oscillations-Mobs:  G-I, II, III, IV (PA's, Inf., Post.)  [x] (85381) Provided manual therapy to mobilize soft tissue/joints of cervical/CT, scapular GHJ and UE for the purpose of modulating pain, promoting relaxation,  increasing ROM, reducing/eliminating soft tissue swelling/inflammation/restriction, improving soft tissue extensibility and allowing for proper ROM for normal function with self care, reaching, carrying, lifting, house/yardwork, driving/computer work    Modalities:     Charges:  Timed Code Treatment Minutes: 45   Total Treatment Minutes: 45       [] EVAL - LOW (59156)   [] EVAL - MOD (88785)  [] EVAL - HIGH (97936)  [] RE-EVAL (83349)   [x] DV(66739) x 1       [] Ionto  [x] NMR (84357) x 1      [] Vaso  [x] Manual (02423) x1      [] Ultrasound   [] TA x        [] Mech Traction (77519)  [] Aquatic Therapy x      [] ES (un) (47810):   [] Home Management Training x  [] ES(attended) (82173)   [] Dry Needling 1-2 muscles (26093):  [] Dry Needling 3+ muscles (744995  [] Group:      [] Other:                    GOALS:  Patient stated goal: to get back to doing everything  [x] Progressing: [] Met: [] Not Met: [] Adjusted    Therapist goals for Patient:   Short Term Goals: To be achieved in: 2 weeks  1. Independent in HEP and progression per patient tolerance, in order to prevent re-injury. [] Progressing: [x] Met: [] Not Met: [] Adjusted  2. Patient will have a decrease in pain to facilitate improvement in movement, function, and ADLs as indicated by Functional Deficits. [x] Progressing: [] Met: [] Not Met: [] Adjusted    Long Term Goals: To be achieved in: 6-8 weeks  1. Disability index score of 17% or less for the UEFI or Quick DASH to assist with reaching prior level of function. [x] Progressing: [] Met: [] Not Met: [] Adjusted  2.  Patient will demonstrate increased AROM to L shoulder flexion 150*, ABD to 150*, ER to 70*, IR to 40* to allow for proper joint functioning as indicated by patients Functional Deficits. [x] Progressing: [] Met: [] Not Met: [] Adjusted  3. Patient will demonstrate an increase in Strength to 5/5 all shoulder musculature to allow for proper functional mobility as indicated by patients Functional Deficits. [x] Progressing: [] Met: [] Not Met: [] Adjusted  4. Patient will return to functional activities including driving and cooking without increased symptoms or restriction. [x] Progressing: [] Met: [] Not Met: [] Adjusted  5. Patient will return to household management and work duties without increased pain or restriction. [x] Progressing: [] Met: [] Not Met: [] Adjusted    Overall Progression Towards Functional goals/ Treatment Progress Update:  [] Patient is progressing as expected towards functional goals listed. [x] Progression is slowed due to complexities/Impairments listed. [] Progression has been slowed due to co-morbidities. [] Plan just implemented, too soon to assess goals progression <30days   [] Goals require adjustment due to lack of progress  [] Patient is not progressing as expected and requires additional follow up with physician  [] Other    Persisting Functional Limitations/Impairments:  []Sitting []Standing   []Transfers  [x]Sleeping   [x]Reaching [x]Lifting   [x]ADLs [x]Housework  [x]Driving [x]Job related tasks  [x]Sports/Recreation []Other:    ASSESSMENT: Inferior joint capsule tightness continues to be present with passive mobilizations with motion restricted mostly into IR/ER motions. End range flexion and abduction are also restricted but mostly but pain more than capsular restrictions. Motion and function continue to make slow but steady progress with improvements in functional strength and motion noted with report of increased tolerance to activity. Continue with skilled therapy to work toward improving strength and motion for full return to PLOF without restrictions.      Treatment/Activity Tolerance:  [x] Pt able to complete treatment [] Patient limited by fatique  [] Patient limited by pain  [] Patient limited by other medical complications  [] Other:     Prognosis: [x] Good [] Fair  [] Poor    Patient Requires Follow-up: [x] Yes  [] No    Return to Play:    [x]  N/A   []  Stage 1: Intro to Strength   []  Stage 2: Dynamic Strength and Intro to Plyometrics   []  Stage 3: Advanced Plyometrics and Intro to Throwing   []  Stage 4: Sport specific Training/Return to Sport   []  Ready to Return to Play, [email or web address]   []  Not Ready for Return to Sports    Comments:      PLAN: See eval. PT 1-2x / week for 6-8 weeks. [x] Continue per plan of care [] Alter current plan (see comments)  [] Plan of care initiated [] Hold pending MD visit [] Discharge    Electronically signed by: Grant Mock    Note: If patient does not return for scheduled/ recommended follow up visits, this note will serve as a discharge from care along with most recent update on progress.

## 2020-12-28 ENCOUNTER — HOSPITAL ENCOUNTER (OUTPATIENT)
Dept: PHYSICAL THERAPY | Age: 60
Setting detail: THERAPIES SERIES
Discharge: HOME | End: 2020-12-28
Payer: COMMERCIAL

## 2020-12-28 PROCEDURE — 97140 MANUAL THERAPY 1/> REGIONS: CPT

## 2020-12-28 PROCEDURE — 97110 THERAPEUTIC EXERCISES: CPT

## 2020-12-28 PROCEDURE — 97112 NEUROMUSCULAR REEDUCATION: CPT

## 2020-12-28 NOTE — FLOWSHEET NOTE
Triceps (C7)  4         RESTRICTIONS/PRECAUTIONS: None    Exercises/Interventions:   Therapeutic Exercise (33071) Resistance / level Sets / Seconds  Reps Notes/Cues   pulleys Flex, abd 10\" 15 Added 11/4        Upper trap stretch  10\" 10 Porfirio    Wall climbs  10\" 10 Added 12/14   Porfirio ER lime 2 10    ER ranger Added 11/4     Added 11/30   Scapular retraction added 11/12   ^11/30   Shoulder extension Added 11/12   ^11/30    theraband IR blue 2 10 Added 11/30   theraband ER blue 2 10 Added 11/30   Doorway pec stretch Hands up 20\" 3 Added 11/12   IR towel stretch  10\" 10 Added 11/12   CBA stretch   10\" 10x  Added 11/18     Prone rows 3# 2 10 Added 11/30   Cueing to limit shoulder elevation   Prone extension 3# 2 10    sidelying ER 3# 2 10 Cueing for full range and proper technique   Sleeper stretch  10\" 10                  Therapeutic Activities (93389)                                          Neuromuscular Re-ed (73960)       SL ABD   3# 1 20 Clinician assist on scap to improve upward rotation and depression    Serratus punches 5# 2 10 Added 11/30 - cueing for improved control   Wall pushups  2 10 Added 12/14   Spider walks orange 1 10    Body blade - at neutral (IR/ER, ant/post)   Medium black 20\" 3    Prone horizontal abd eccentric 1 10 Manual assist for full range, tactile facilitation          Manual Intervention (99557)       Shld /GH Mobs GII/III posterior & inferior 3'  Added 11/4   STM / trigger point release Teres/lat  subscap  6'      PROM MT All directions 8'                Pt. Education:  -all pt questions were answered    Home Exercise Program:  Access Code: [email or web address]   URL: [email or web address]   Date: 10/28/2020   Prepared by: Sheri De Guzman     Exercises   Supine Chin Tuck - 10 reps - 3 sets - 1x daily - 7x weekly   Supine Shoulder Flexion with Dowel - 10 reps - 10\" hold - 1x daily - 7x weekly   Seated Gentle Upper Trapezius Stretch - 5 reps - 20\" hold - 1x daily - 7x weekly   Standing Shoulder reaching, carrying, lifting, house/yardwork, driving/computer work      Manual Treatments:  PROM / STM / Oscillations-Mobs:  G-I, II, III, IV (PA's, Inf., Post.)  [x] (35838) Provided manual therapy to mobilize soft tissue/joints of cervical/CT, scapular GHJ and UE for the purpose of modulating pain, promoting relaxation,  increasing ROM, reducing/eliminating soft tissue swelling/inflammation/restriction, improving soft tissue extensibility and allowing for proper ROM for normal function with self care, reaching, carrying, lifting, house/yardwork, driving/computer work    Modalities:     Charges:  Timed Code Treatment Minutes: 42   Total Treatment Minutes: 42       [] EVAL - LOW (06311)   [] EVAL - MOD (40462)  [] EVAL - HIGH (49175)  [] RE-EVAL (49374)   [x] TY(72508) x 1       [] Ionto  [x] NMR (83736) x 1      [] Vaso  [x] Manual (49732) x1      [] Ultrasound   [] TA x        [] Mech Traction (21456)  [] Aquatic Therapy x      [] ES (un) (43973):   [] Home Management Training x  [] ES(attended) (52376)   [] Dry Needling 1-2 muscles (11876):  [] Dry Needling 3+ muscles (277271  [] Group:      [] Other:                    GOALS:  Patient stated goal: to get back to doing everything  [x] Progressing: [] Met: [] Not Met: [] Adjusted    Therapist goals for Patient:   Short Term Goals: To be achieved in: 2 weeks  1. Independent in HEP and progression per patient tolerance, in order to prevent re-injury. [] Progressing: [x] Met: [] Not Met: [] Adjusted  2. Patient will have a decrease in pain to facilitate improvement in movement, function, and ADLs as indicated by Functional Deficits. [x] Progressing: [] Met: [] Not Met: [] Adjusted    Long Term Goals: To be achieved in: 6-8 weeks  1. Disability index score of 17% or less for the UEFI or Quick DASH to assist with reaching prior level of function. [x] Progressing: [] Met: [] Not Met: [] Adjusted  2.  Patient will demonstrate increased AROM to L shoulder flexion related tasks. Treatment/Activity Tolerance:  [x] Pt able to complete treatment [] Patient limited by fatique  [] Patient limited by pain  [] Patient limited by other medical complications  [] Other:     Prognosis: [x] Good [] Fair  [] Poor    Patient Requires Follow-up: [x] Yes  [] No    Return to Play:    [x]  N/A   []  Stage 1: Intro to Strength   []  Stage 2: Dynamic Strength and Intro to Plyometrics   []  Stage 3: Advanced Plyometrics and Intro to Throwing   []  Stage 4: Sport specific Training/Return to Sport   []  Ready to Return to Play, [email or web address]   []  Not Ready for Return to Sports    Comments:      PLAN: See eval. PT 1-2x / week for 6-8 weeks. [x] Continue per plan of care [] Alter current plan (see comments)  [] Plan of care initiated [] Hold pending MD visit [] Discharge    Electronically signed by: Jf Campos    Note: If patient does not return for scheduled/ recommended follow up visits, this note will serve as a discharge from care along with most recent update on progress.

## 2020-12-30 ENCOUNTER — APPOINTMENT (OUTPATIENT)
Dept: PHYSICAL THERAPY | Age: 60
End: 2020-12-30
Payer: COMMERCIAL

## 2021-01-04 ENCOUNTER — APPOINTMENT (OUTPATIENT)
Dept: PHYSICAL THERAPY | Age: 61
End: 2021-01-04
Payer: COMMERCIAL

## 2021-01-05 ENCOUNTER — HOSPITAL ENCOUNTER (OUTPATIENT)
Dept: PHYSICAL THERAPY | Age: 61
Setting detail: THERAPIES SERIES
Discharge: HOME | End: 2021-01-05
Payer: COMMERCIAL

## 2021-01-05 NOTE — PROGRESS NOTES
[facility]    Physical Therapy  Cancellation/No-show Note  Patient Name:  Nedra Barbour  :  1960   Date:  2021    Cancelled visits to date: 1    No-shows to date: 0    For today's appointment patient:  [x]  Cancelled  []  Rescheduled appointment  []  No-show     Reason given by patient:  []  Patient ill  []  Conflicting appointment  []  No transportation    []  Conflict with work  []  No reason given  [x]  Other:  being taken to ER. Comments:      Phone call information:   []  Phone call made today to patient at _ time at number provided:      []  Patient answered, conversation as follows:    []  Patient did not answer, message left as follows:  []  Phone call not made today  [x]  Phone call not needed - pt contacted us to cancel and provided reason for cancellation.      Electronically signed by:  Eunice Walden PTA

## 2021-01-08 ENCOUNTER — HOSPITAL ENCOUNTER (OUTPATIENT)
Dept: PHYSICAL THERAPY | Age: 61
Setting detail: THERAPIES SERIES
Discharge: HOME | End: 2021-01-08
Payer: COMMERCIAL

## 2021-01-08 NOTE — PROGRESS NOTES
[address] [facility]    Physical Therapy  Cancellation/No-show Note  Patient Name:  Roya Almaraz  :  1960   Date:  2021    Cancelled visits to date: 2  ,   No-shows to date: 0    For today's appointment patient:  [x]  Cancelled  []  Rescheduled appointment  []  No-show     Reason given by patient:  []  Patient ill  []  Conflicting appointment  []  No transportation    []  Conflict with work  []  No reason given  [x]  Other:  still in hospital with heart issues. Comments:      Phone call information:   []  Phone call made today to patient at _ time at number provided:      []  Patient answered, conversation as follows:    []  Patient did not answer, message left as follows:  []  Phone call not made today  [x]  Phone call not needed - pt contacted us to cancel and provided reason for cancellation.      Electronically signed by:  Hampton Lesches, PTA

## 2021-01-11 ENCOUNTER — HOSPITAL ENCOUNTER (OUTPATIENT)
Dept: PHYSICAL THERAPY | Age: 61
Setting detail: THERAPIES SERIES
Discharge: HOME | End: 2021-01-11
Payer: COMMERCIAL

## 2021-01-11 PROCEDURE — 97110 THERAPEUTIC EXERCISES: CPT

## 2021-01-11 PROCEDURE — 97140 MANUAL THERAPY 1/> REGIONS: CPT

## 2021-01-11 PROCEDURE — 97112 NEUROMUSCULAR REEDUCATION: CPT

## 2021-01-11 NOTE — FLOWSHEET NOTE
[facility]  Phone: [phone]   Fax: [phone]    Physical Therapy Treatment Note/ Progress Report:     Date:  2021    Patient Name:  Chris Estrada    :  1960  MRN: [id number]  Restrictions/Precautions:    Medical/Treatment Diagnosis Information:  Diagnosis: M25.512, G89.29 (ICD-10-CM) - Chronic left shoulder pain  Treatment Diagnosis: L shoulder and cervical decreased ROM, decreased strength, increased pain  Insurance/Certification information:  PT Insurance Information: [employer]  Physician Information:  Referring Practitioner: Dr. Josep Mazariegos of care signed (Y/N): [x]  Yes []  No     Date of Patient follow up with Physician:      Progress Report: []  Yes  [x]  No     Date Range for reporting period:  Beginning: 10/28/20  Endin20    Progress report due (10 Rx/or 30 days whichever is less): visit #17 or 818     Recertification due (POC duration/ or 90 days whichever is less): visit #16 or 20 (date)     Visit # Insurance Allowable Auth required? Date Range    []  Yes  [x]  No n/a     Latex Allergy:  [x]NO      []YES  Preferred Language for Healthcare:   [x]English       []other:    Functional Scale:        Date assessed:  Quick DASH: raw score = 24; dysfunction = 30%  20  Quick DASH: raw score = 27; dysfunction = 36%  10/28/20     Pain level:  4/10     SUBJECTIVE: Pt has been going to [employer] working out and feels shoulder continues to improve. Still has pain and stiffness with certain motions but overall continues to feel decreased pain and improved motion.      OBJECTIVE:     PROM AROM     L R L R   Shoulder Flexion  135 127   Shoulder Abduction  108 100   Shoulder External Rotation  50 @90abd     Shoulder Internal Rotation  ~25 @90abd ~L1 pain         Strength (0-5) Left Right    Shoulder Flex 4 pain   Shoulder Abd (C5) 4- pain   Shoulder ER 4- pain   Shoulder IR 4+ pain Biceps (C6) 4+   Triceps (C7)  4         RESTRICTIONS/PRECAUTIONS: None    Exercises/Interventions:   Therapeutic Exercise (20869) Resistance / level Sets / Seconds  Reps Notes/Cues   pulleys Flex, abd 10\" 15 Added 11/4           Wall climbs  10\" 10 Added 12/14   Porfirio ER lime 2 10    ER ranger Added 11/4     Added 11/30   Scapular retraction added 11/12   ^11/30   Shoulder extension Added 11/12   ^11/30    theraband IR blue 2 10 Added 11/30   theraband ER blue 2 10 Added 11/30   Added 11/12   IR towel stretch  10\" 10 Added 11/12   CBA stretch   10\" 10x  Added 11/18     Prone rows 3# 3 10 Added 11/30   Cueing to limit shoulder elevation   Prone extension 3# 3 10    sidelying ER 3# 2 10 Cueing for full range and proper technique   Sleeper stretch  10\" 10                  Therapeutic Activities (20631)                                          Neuromuscular Re-ed (36885)       SL ABD   3# 1 20 Clinician assist on scap to improve upward rotation and depression    Serratus punches 5# 2 10 Added 11/30 - cueing for improved control   Wall pushups  2 10 Added 12/14   Spider walks orange 1 10    Body blade - at neutral (IR/ER, ant/post)   Medium black 20\" 3    Prone horizontal abd 3# 2 10 Manual assist for full range, tactile facilitation          Manual Intervention (15725)       Shld /GH Mobs GII/III posterior & inferior 3'  Added 11/4   STM / trigger point release Teres/lat  subscap  6'      PROM MT All directions 8'                Pt. Education:  -all pt questions were answered    Home Exercise Program:  Access Code: [email or web address]   URL: [email or web address]   Date: 10/28/2020   Prepared by: Emilia Robles     Exercises   Supine Chin Tuck - 10 reps - 3 sets - 1x daily - 7x weekly   Supine Shoulder Flexion with Dowel - 10 reps - 10\" hold - 1x daily - 7x weekly   Seated Gentle Upper Trapezius Stretch - 5 reps - 20\" hold - 1x daily - 7x weekly   Standing Shoulder External Rotation with Resistance - 10 reps - 3 sets - 1x daily - 7x weekly   Seated Shoulder Flexion Towel Slide at Table Top - 10 reps - 10 sec hold - 1x daily - 7x weekly       Therapeutic Exercise and NMR EXR  [x] (40351) Provided verbal/tactile cueing for activities related to strengthening, flexibility, endurance, ROM  for improvements in scapular, scapulothoracic and UE control with self care, reaching, carrying, lifting, house/yardwork, driving/computer work.    [] (59498) Provided verbal/tactile cueing for activities related to improving balance, coordination, kinesthetic sense, posture, motor skill, proprioception  to assist with  scapular, scapulothoracic and UE control with self care, reaching, carrying, lifting, house/yardwork, driving/computer work.  [] (22303) Therapist is in constant attendance of 2 or more patients providing skilled therapy interventions, but not providing any significant amount of measurable one-on-one time to either patient, for improvements in cervical, scapular, scapulothoracic and UE control with self care, reaching, carrying, lifting, house/yardwork, driving, computer work. Therapeutic Activities:    [] (82498 or 37651) Provided verbal/tactile cueing for activities related to improving balance, coordination, kinesthetic sense, posture, motor skill, proprioception and motor activation to allow for proper function of scapular, scapulothoracic and UE control with self care, carrying, lifting, driving/computer work.      Home Exercise Program:    [x] (88704) Reviewed/Progressed HEP activities related to strengthening, flexibility, endurance, ROM of scapular, scapulothoracic and UE control with self care, reaching, carrying, lifting, house/yardwork, driving/computer work  [] (29052) Reviewed/Progressed HEP activities related to improving balance, coordination, kinesthetic sense, posture, motor skill, proprioception of scapular, scapulothoracic and UE control with self care, reaching, carrying, lifting, house/yardwork, driving/computer functioning as indicated by patients Functional Deficits. [x] Progressing: [] Met: [] Not Met: [] Adjusted  3. Patient will demonstrate an increase in Strength to 5/5 all shoulder musculature to allow for proper functional mobility as indicated by patients Functional Deficits. [x] Progressing: [] Met: [] Not Met: [] Adjusted  4. Patient will return to functional activities including driving and cooking without increased symptoms or restriction. [x] Progressing: [] Met: [] Not Met: [] Adjusted  5. Patient will return to household management and work duties without increased pain or restriction. [x] Progressing: [] Met: [] Not Met: [] Adjusted    Overall Progression Towards Functional goals/ Treatment Progress Update:  [] Patient is progressing as expected towards functional goals listed. [x] Progression is slowed due to complexities/Impairments listed. [] Progression has been slowed due to co-morbidities. [] Plan just implemented, too soon to assess goals progression <30days   [] Goals require adjustment due to lack of progress  [] Patient is not progressing as expected and requires additional follow up with physician  [] Other    Persisting Functional Limitations/Impairments:  []Sitting []Standing   []Transfers  [x]Sleeping   [x]Reaching [x]Lifting   [x]ADLs [x]Housework  [x]Driving [x]Job related tasks  [x]Sports/Recreation []Other:    ASSESSMENT:  Pt continues to have tightness in shoulder joint with manual stretching especially with abduction and IR motions with improvement noted in flexion and ER motions. continues to need cueing for proper technique with ER exercises. Continue to work toward improving strength and functional mobility to progress toward goals and return to full independence.       Treatment/Activity Tolerance:  [x] Pt able to complete treatment [] Patient limited by fatique  [] Patient limited by pain  [] Patient limited by other medical complications  [] Other:     Prognosis: [x] Good [] Fair  [] Poor    Patient Requires Follow-up: [x] Yes  [] No    Return to Play:    [x]  N/A   []  Stage 1: Intro to Strength   []  Stage 2: Dynamic Strength and Intro to Plyometrics   []  Stage 3: Advanced Plyometrics and Intro to Throwing   []  Stage 4: Sport specific Training/Return to Sport   []  Ready to Return to Play, [employer]   []  Not Ready for Return to Sports    Comments:      PLAN: See eval. PT 1-2x / week for 6-8 weeks. [x] Continue per plan of care [] Alter current plan (see comments)  [] Plan of care initiated [] Hold pending MD visit [] Discharge    Electronically signed by: Kin Jones    Note: If patient does not return for scheduled/ recommended follow up visits, this note will serve as a discharge from care along with most recent update on progress.

## 2021-01-18 ENCOUNTER — HOSPITAL ENCOUNTER (OUTPATIENT)
Dept: PHYSICAL THERAPY | Age: 61
Setting detail: THERAPIES SERIES
Discharge: HOME | End: 2021-01-18
Payer: COMMERCIAL

## 2021-01-18 PROCEDURE — 97110 THERAPEUTIC EXERCISES: CPT

## 2021-01-18 PROCEDURE — 97140 MANUAL THERAPY 1/> REGIONS: CPT

## 2021-01-18 NOTE — FLOWSHEET NOTE
(C6) 4+   Triceps (C7)  4         RESTRICTIONS/PRECAUTIONS: None    Exercises/Interventions:   Therapeutic Exercise (33718) Resistance / level Sets / Seconds  Reps Notes/Cues   pulleys Flex, abd 10\" 15 Added 11/4            10\" 10 Added 12/14   lime 2 10    Added 11/4     Added 11/30   added 11/12   ^11/30   Added 11/12   ^11/30    blue 2 10 Added 11/30   blue 2 10 Added 11/30   Added 11/12    10\" 10 Added 11/12    10\" 10x  Added 11/18     Prone rows 3# 3 10 Added 11/30   Cueing to limit shoulder elevation   Prone extension 3# 3 10    sidelying ER 3# 2 10 Cueing for full range and proper technique   Sleeper stretch  10\" 10                  Therapeutic Activities (83276)                                          Neuromuscular Re-ed (79168)       3# 1 20 Clinician assist on scap to improve upward rotation and depression    5# 2 10 Added 11/30 - cueing for improved control    2 10 Added 12/14   orange 1 10    Medium black 20\" 3    3# 2 10 Manual assist for full range, tactile facilitation          Manual Intervention (76235)       Shld /GH Mobs GII/III posterior & inferior 5'  Added 11/4   STM / trigger point release Teres/lat  subscap  6'      PROM MT All directions 14'                Pt. Education:  -all pt questions were answered    Home Exercise Program:  Access Code: [email or web address]   URL: [email or web address]   Date: 10/28/2020   Prepared by: Adilia Yates     Exercises   Supine Chin Tuck - 10 reps - 3 sets - 1x daily - 7x weekly   Supine Shoulder Flexion with Dowel - 10 reps - 10\" hold - 1x daily - 7x weekly   Seated Gentle Upper Trapezius Stretch - 5 reps - 20\" hold - 1x daily - 7x weekly   Standing Shoulder External Rotation with Resistance - 10 reps - 3 sets - 1x daily - 7x weekly   Seated Shoulder Flexion Towel Slide at Table Top - 10 reps - 10 sec hold - 1x daily - 7x weekly       Therapeutic Exercise and NMR EXR  [x] (79441) Provided verbal/tactile cueing for activities related to strengthening, flexibility, endurance, ROM  for improvements in scapular, scapulothoracic and UE control with self care, reaching, carrying, lifting, house/yardwork, driving/computer work.    [] (02630) Provided verbal/tactile cueing for activities related to improving balance, coordination, kinesthetic sense, posture, motor skill, proprioception  to assist with  scapular, scapulothoracic and UE control with self care, reaching, carrying, lifting, house/yardwork, driving/computer work.  [] (19732) Therapist is in constant attendance of 2 or more patients providing skilled therapy interventions, but not providing any significant amount of measurable one-on-one time to either patient, for improvements in cervical, scapular, scapulothoracic and UE control with self care, reaching, carrying, lifting, house/yardwork, driving, computer work. Therapeutic Activities:    [] (78882 or 20249) Provided verbal/tactile cueing for activities related to improving balance, coordination, kinesthetic sense, posture, motor skill, proprioception and motor activation to allow for proper function of scapular, scapulothoracic and UE control with self care, carrying, lifting, driving/computer work.      Home Exercise Program:    [x] (86607) Reviewed/Progressed HEP activities related to strengthening, flexibility, endurance, ROM of scapular, scapulothoracic and UE control with self care, reaching, carrying, lifting, house/yardwork, driving/computer work  [] (09132) Reviewed/Progressed HEP activities related to improving balance, coordination, kinesthetic sense, posture, motor skill, proprioception of scapular, scapulothoracic and UE control with self care, reaching, carrying, lifting, house/yardwork, driving/computer work      Manual Treatments:  PROM / STM / Oscillations-Mobs:  G-I, II, III, IV (PA's, Inf., Post.)  [x] (06045) Provided manual therapy to mobilize soft tissue/joints of cervical/CT, scapular GHJ and UE for the purpose of modulating pain, promoting relaxation,  increasing ROM, reducing/eliminating soft tissue swelling/inflammation/restriction, improving soft tissue extensibility and allowing for proper ROM for normal function with self care, reaching, carrying, lifting, house/yardwork, driving/computer work    Modalities:     Charges:  Timed Code Treatment Minutes: 35   Total Treatment Minutes: 35       [] EVAL - LOW (43849)   [] EVAL - MOD (54853)  [] EVAL - HIGH (01028)  [] RE-EVAL (50196)   [x] VR(41467) x 1       [] Ionto  [] NMR (38981) x       [] Vaso  [x] Manual (18605) x1      [] Ultrasound   [] TA x        [] Mech Traction (69888)  [] Aquatic Therapy x      [] ES (un) (68130):   [] Home Management Training x  [] ES(attended) (00243)   [] Dry Needling 1-2 muscles (80823):  [] Dry Needling 3+ muscles (472828  [] Group:      [] Other:                    GOALS:  Patient stated goal: to get back to doing everything  [x] Progressing: [] Met: [] Not Met: [] Adjusted    Therapist goals for Patient:   Short Term Goals: To be achieved in: 2 weeks  1. Independent in HEP and progression per patient tolerance, in order to prevent re-injury. [] Progressing: [x] Met: [] Not Met: [] Adjusted  2. Patient will have a decrease in pain to facilitate improvement in movement, function, and ADLs as indicated by Functional Deficits. [x] Progressing: [] Met: [] Not Met: [] Adjusted    Long Term Goals: To be achieved in: 6-8 weeks  1. Disability index score of 17% or less for the UEFI or Quick DASH to assist with reaching prior level of function. [x] Progressing: [] Met: [] Not Met: [] Adjusted  2. Patient will demonstrate increased AROM to L shoulder flexion 150*, ABD to 150*, ER to 70*, IR to 40* to allow for proper joint functioning as indicated by patients Functional Deficits. [x] Progressing: [] Met: [] Not Met: [] Adjusted  3.  Patient will demonstrate an increase in Strength to 5/5 all shoulder musculature to allow for proper functional mobility as indicated by patients Functional Deficits. [x] Progressing: [] Met: [] Not Met: [] Adjusted  4. Patient will return to functional activities including driving and cooking without increased symptoms or restriction. [x] Progressing: [] Met: [] Not Met: [] Adjusted  5. Patient will return to household management and work duties without increased pain or restriction. [x] Progressing: [] Met: [] Not Met: [] Adjusted    Overall Progression Towards Functional goals/ Treatment Progress Update:  [] Patient is progressing as expected towards functional goals listed. [x] Progression is slowed due to complexities/Impairments listed. [] Progression has been slowed due to co-morbidities. [] Plan just implemented, too soon to assess goals progression <30days   [] Goals require adjustment due to lack of progress  [] Patient is not progressing as expected and requires additional follow up with physician  [] Other    Persisting Functional Limitations/Impairments:  []Sitting []Standing   []Transfers  [x]Sleeping   [x]Reaching [x]Lifting   [x]ADLs [x]Housework  [x]Driving [x]Job related tasks  [x]Sports/Recreation []Other:    ASSESSMENT:  Held most exercises this date since pt has been doing workouts at gym several times a week with focus this date on passive stretching and joint mobility with a few exercises performed per flow sheet. Decreased visits to 1x/week per POC with discussion of progressing to HEP over the next few weeks as pt appears to be progressing well with daily tasks and motion.       Treatment/Activity Tolerance:  [x] Pt able to complete treatment [] Patient limited by fatique  [] Patient limited by pain  [] Patient limited by other medical complications  [] Other:     Prognosis: [x] Good [] Fair  [] Poor    Patient Requires Follow-up: [x] Yes  [] No    Return to Play:    [x]  N/A   []  Stage 1: Intro to Strength   []  Stage 2: Dynamic Strength and Intro to Plyometrics   []  Stage 3: Advanced Plyometrics and Intro to Throwing   []  Stage 4: Sport specific Training/Return to Sport   []  Ready to Return to Play, [employer]   []  Not Ready for Return to Sports    Comments:      PLAN: See eval. PT 1-2x / week for 6-8 weeks. [x] Continue per plan of care [] Alter current plan (see comments)  [] Plan of care initiated [] Hold pending MD visit [] Discharge    Electronically signed by: Ed Penny    Note: If patient does not return for scheduled/ recommended follow up visits, this note will serve as a discharge from care along with most recent update on progress.

## 2021-01-22 ENCOUNTER — APPOINTMENT (OUTPATIENT)
Dept: PHYSICAL THERAPY | Age: 61
End: 2021-01-22
Payer: COMMERCIAL

## 2021-01-25 ENCOUNTER — HOSPITAL ENCOUNTER (OUTPATIENT)
Dept: PHYSICAL THERAPY | Age: 61
Setting detail: THERAPIES SERIES
Discharge: HOME | End: 2021-01-25
Payer: COMMERCIAL

## 2021-01-25 PROCEDURE — 97140 MANUAL THERAPY 1/> REGIONS: CPT

## 2021-01-25 PROCEDURE — 97112 NEUROMUSCULAR REEDUCATION: CPT

## 2021-01-25 PROCEDURE — 97110 THERAPEUTIC EXERCISES: CPT

## 2021-01-25 NOTE — FLOWSHEET NOTE
Lucio Raygoza  Phone: [phone]   Fax: [phone]    Physical Therapy Treatment Note/ Progress Report:     Date:  2021    Patient Name:  Ronni Lay    :  1960  MRN: [id number]  Restrictions/Precautions:    Medical/Treatment Diagnosis Information:  Diagnosis: M25.512, G89.29 (ICD-10-CM) - Chronic left shoulder pain  Treatment Diagnosis: L shoulder and cervical decreased ROM, decreased strength, increased pain  Insurance/Certification information:  PT Insurance Information: [employer]  Physician Information:  Referring Practitioner: Dr. Car Layer of care signed (Y/N): [x]  Yes []  No     Date of Patient follow up with Physician:      Progress Report: []  Yes  [x]  No     Date Range for reporting period:  Beginning: 10/28/20  Endin20    Progress report due (10 Rx/or 30 days whichever is less): visit #17 or 3888     Recertification due (POC duration/ or 90 days whichever is less): visit #16 or 20 (date)     Visit # Insurance Allowable Auth required? Date Range   3-2021   20 []  Yes  [x]  No n/a     Latex Allergy:  [x]NO      []YES  Preferred Language for Healthcare:   [x]English       []other:    Functional Scale:        Date assessed:  Quick DASH: raw score = 24; dysfunction = 30%  20  Quick DASH: raw score = 27; dysfunction = 36%  10/28/20     Pain level:  4/10     SUBJECTIVE: Shoulder continues to hurt most at night when trying to sleep. Motion is getting better, consistent progress noted with tasks.      OBJECTIVE:     PROM AROM     L R L R   Shoulder Flexion  135 127   Shoulder Abduction  108 100   Shoulder External Rotation  50 @90abd     Shoulder Internal Rotation  ~25 @90abd ~L1 pain         Strength (0-5) Left Right    Shoulder Flex 4 pain   Shoulder Abd (C5) 4- pain   Shoulder ER 4- pain   Shoulder IR 4+ pain   Biceps (C6) 4+   Triceps (C7)  4         RESTRICTIONS/PRECAUTIONS: None    Exercises/Interventions:   Therapeutic Exercise (65508) Resistance / level Sets / Seconds  Reps Notes/Cues   Arm bike 2' forward  2' backward 4'  Added 1/25    Added 11/4            10\" 10 Added 12/14   lime 2 10    Added 11/4     Added 11/30   added 11/12   ^11/30   Added 11/12   ^11/30    blue 2 10 Added 11/30   blue 2 10 Added 11/30   Added 11/12    10\" 10 Added 11/12    10\" 10x  Added 11/18     Prone rows 3# 3 10 Added 11/30   Cueing to limit shoulder elevation   Prone extension 3# 3 10    sidelying ER 3# 2 10 Cueing for full range and proper technique   Sleeper stretch  10\" 10           Cable column lat pull downs  5 plates 3B71 Added 0/65   Cable column rows  5 plates 3D75 Added 4/13   Cable column ER  1 plate 4H44 Added 5/64   Cable column IR  2 plates 3I45 Added 1/76                 Therapeutic Activities (55471)                                          Neuromuscular Re-ed (21427)       3# 1 20 Clinician assist on scap to improve upward rotation and depression    Serratus punches5# 2 10 Added 11/30 - cueing for improved control   Wall pushups 2 10 Added 12/14   orange 1 10    Medium black 20\" 3    Prone horizontal abd3# 2 10 Manual assist for full range, tactile facilitation          Manual Intervention (51820)       Shld /GH Mobs GII/III posterior & inferior 5'  Added 11/4   STM / trigger point release Teres/lat  subscap  6'      PROM MT All directions 14'                Pt. Education:  -all pt questions were answered    Home Exercise Program:  Access Code: [email or web address]   URL: [email or web address]   Date: 10/28/2020   Prepared by: Cat Crowell     Exercises   Supine Chin Tuck - 10 reps - 3 sets - 1x daily - 7x weekly   Supine Shoulder Flexion with Dowel - 10 reps - 10\" hold - 1x daily - 7x weekly   Seated Gentle Upper Trapezius Stretch - 5 reps - 20\" hold - 1x daily - 7x weekly   Standing Shoulder External Rotation with Resistance - 10 reps - 3 sets - 1x daily - 7x weekly   Seated Shoulder Flexion Towel Slide at Table Top - 10 reps - 10 sec hold - 1x daily - 7x weekly       Therapeutic Exercise and NMR EXR  [x] (23400) Provided verbal/tactile cueing for activities related to strengthening, flexibility, endurance, ROM  for improvements in scapular, scapulothoracic and UE control with self care, reaching, carrying, lifting, house/yardwork, driving/computer work.    [] (33163) Provided verbal/tactile cueing for activities related to improving balance, coordination, kinesthetic sense, posture, motor skill, proprioception  to assist with  scapular, scapulothoracic and UE control with self care, reaching, carrying, lifting, house/yardwork, driving/computer work.  [] (00823) Therapist is in constant attendance of 2 or more patients providing skilled therapy interventions, but not providing any significant amount of measurable one-on-one time to either patient, for improvements in cervical, scapular, scapulothoracic and UE control with self care, reaching, carrying, lifting, house/yardwork, driving, computer work. Therapeutic Activities:    [] (35819 or 61753) Provided verbal/tactile cueing for activities related to improving balance, coordination, kinesthetic sense, posture, motor skill, proprioception and motor activation to allow for proper function of scapular, scapulothoracic and UE control with self care, carrying, lifting, driving/computer work.      Home Exercise Program:    [x] (70013) Reviewed/Progressed HEP activities related to strengthening, flexibility, endurance, ROM of scapular, scapulothoracic and UE control with self care, reaching, carrying, lifting, house/yardwork, driving/computer work  [] (51090) Reviewed/Progressed HEP activities related to improving balance, coordination, kinesthetic sense, posture, motor skill, proprioception of scapular, scapulothoracic and UE control with self care, reaching, carrying, lifting, house/yardwork, driving/computer work      Manual Treatments:  PROM / STM / Oscillations-Mobs:  G-I, II, III, IV (PA's, Inf., Post.)  [x] (78322) Provided manual therapy to mobilize soft tissue/joints of cervical/CT, scapular GHJ and UE for the purpose of modulating pain, promoting relaxation,  increasing ROM, reducing/eliminating soft tissue swelling/inflammation/restriction, improving soft tissue extensibility and allowing for proper ROM for normal function with self care, reaching, carrying, lifting, house/yardwork, driving/computer work    Modalities:     Charges:  Timed Code Treatment Minutes: 40   Total Treatment Minutes: 40       [] EVAL - LOW (22549)   [] EVAL - MOD (20206)  [] EVAL - HIGH (43854)  [] RE-EVAL (73968)   [x] QF(56728) x 1       [] Ionto  [x] NMR (51104) x 1      [] Vaso  [x] Manual (50774) x1      [] Ultrasound   [] TA x        [] Mech Traction (92170)  [] Aquatic Therapy x      [] ES (un) (37767):   [] Home Management Training x  [] ES(attended) (35687)   [] Dry Needling 1-2 muscles (15633):  [] Dry Needling 3+ muscles (402411  [] Group:      [] Other:                    GOALS:  Patient stated goal: to get back to doing everything  [x] Progressing: [] Met: [] Not Met: [] Adjusted    Therapist goals for Patient:   Short Term Goals: To be achieved in: 2 weeks  1. Independent in HEP and progression per patient tolerance, in order to prevent re-injury. [] Progressing: [x] Met: [] Not Met: [] Adjusted  2. Patient will have a decrease in pain to facilitate improvement in movement, function, and ADLs as indicated by Functional Deficits. [x] Progressing: [] Met: [] Not Met: [] Adjusted    Long Term Goals: To be achieved in: 6-8 weeks  1. Disability index score of 17% or less for the UEFI or Quick DASH to assist with reaching prior level of function. [x] Progressing: [] Met: [] Not Met: [] Adjusted  2.  Patient will demonstrate increased AROM to L shoulder flexion 150*, ABD to 150*, ER to 70*, IR to 40* to allow for proper joint functioning as indicated by patients Functional Deficits. [x] Progressing: [] Met: [] Not Met: [] Adjusted  3. Patient will demonstrate an increase in Strength to 5/5 all shoulder musculature to allow for proper functional mobility as indicated by patients Functional Deficits. [x] Progressing: [] Met: [] Not Met: [] Adjusted  4. Patient will return to functional activities including driving and cooking without increased symptoms or restriction. [x] Progressing: [] Met: [] Not Met: [] Adjusted  5. Patient will return to household management and work duties without increased pain or restriction. [x] Progressing: [] Met: [] Not Met: [] Adjusted    Overall Progression Towards Functional goals/ Treatment Progress Update:  [] Patient is progressing as expected towards functional goals listed. [x] Progression is slowed due to complexities/Impairments listed. [] Progression has been slowed due to co-morbidities. [] Plan just implemented, too soon to assess goals progression <30days   [] Goals require adjustment due to lack of progress  [] Patient is not progressing as expected and requires additional follow up with physician  [] Other    Persisting Functional Limitations/Impairments:  []Sitting []Standing   []Transfers  [x]Sleeping   [x]Reaching [x]Lifting   [x]ADLs [x]Housework  [x]Driving [x]Job related tasks  [x]Sports/Recreation []Other:    ASSESSMENT: Upgraded exercises this date as noted above in bold on flow sheet. Pt challenged with upgrades this date with cueing to maintain proper form and minimize shoulder elevation with focus on improving scapular retraction. Pt demonstrates some continued periscapular weakness but significant improvements in strength and functional motion since starting therapy. Continue focus on improving periscapular strength for improved shoulder motion and stability.       Treatment/Activity Tolerance:  [x] Pt able to complete treatment [] Patient limited by fatique  [] Patient limited by pain  [] Patient limited

## 2021-01-29 ENCOUNTER — APPOINTMENT (OUTPATIENT)
Dept: PHYSICAL THERAPY | Age: 61
End: 2021-01-29
Payer: COMMERCIAL

## 2021-02-01 ENCOUNTER — HOSPITAL ENCOUNTER (OUTPATIENT)
Dept: PHYSICAL THERAPY | Age: 61
Setting detail: THERAPIES SERIES
Discharge: HOME | End: 2021-02-01
Payer: COMMERCIAL

## 2021-02-01 PROCEDURE — 97140 MANUAL THERAPY 1/> REGIONS: CPT | Performed by: PHYSICAL THERAPIST

## 2021-02-01 PROCEDURE — 97110 THERAPEUTIC EXERCISES: CPT | Performed by: PHYSICAL THERAPIST

## 2021-02-01 NOTE — PLAN OF CARE
Charissa , [facility]  Phone: [phone]   Fax: [phone]     Physical Therapy Discharge Summary    Dear  Dr. Suman Harmon,    We had the pleasure of treating the following patient for physical therapy services at [facility]. A summary of our findings can be found in the discharge summary below. If you have any questions or concerns regarding these findings, please do not hesitate to contact me at the office phone number checked above. Thank you for the referral.     Physician Signature:________________________________Date:__________________  By signing above (or electronic signature), therapists plan is approved by physician      Functional Outcome: Quick DASH: raw score = 23; dysfunction = 27%    Overall Response to Treatment:   [x]Patient is responding well to treatment and improvement is noted with regards  to goals   [x]Patient should continue to improve in reasonable time if they continue HEP   []Patient has plateaued and is no longer responding to skilled PT intervention    []Patient is getting worse and would benefit from return to referring MD   []Patient unable to adhere to initial POC   [x]Other: Pt. Demonstrates improved shoulder ROM and strength. Reviewed with pt. Importance of continuing home program for further improvement. Date range of Visits: 10/28/20-21  Total Visits: 16    Recommendation:    [x] Discharge to HEP. Follow up with PT PRN.        Physical Therapy Treatment Note/ Progress Report:      Date:  2021    Patient Name:  Cyndee Chapin    :  1960  MRN: [id number]  Restrictions/Precautions:    Medical/Treatment Diagnosis Information:   Diagnosis: M25.512, G89.29 (ICD-10-CM) - Chronic left shoulder pain  Treatment Diagnosis: L shoulder and cervical decreased ROM, decreased strength, increased pain  Insurance/Certification information:  PT Insurance Information: Solidarity  Physician Information:  Referring Practitioner: Dr. Abdoulaye Cervantes of care signed (Y/N): [x]  Yes []  No     Date of Patient follow up with Physician:      Progress Report: []  Yes  [x]  No     Date Range for reporting period:  Beginning: 10/28/20  Endin21    Progress report due (10 Rx/or 30 days whichever is less): d/c    Recertification due (POC duration/ or 90 days whichever is less): d/c    Visit # Insurance Allowable Auth required? Date Range    []  Yes  [x]  No n/a     Latex Allergy:  [x]NO      []YES  Preferred Language for Healthcare:   [x]English       []other:    Functional Scale:        Date assessed:  Quick DASH: raw score = 23; dysfunction = 27%  21  Quick DASH: raw score = 24; dysfunction = 30%  20  Quick DASH: raw score = 27; dysfunction = 36%  10/28/20     Pain level:  0/10      SUBJECTIVE: Pt states that she is still experiencing soreness and discomfort in her shoulder and has not been to the gym over the past week. Occasionally waking her up during the night from sleeping. Pt. Denies pain at rest and feels that she has improved overall. Pt. States she is ready for d/c to home/gym program at this time.      OBJECTIVE:      PROM AROM     L R L R   Shoulder Flexion  155 130   Shoulder Abduction  140 100   Shoulder External Rotation  70 @90abd     Shoulder Internal Rotation  ~30 @90abd ~L1          Strength (0-5) Left Right    Shoulder Flex 4    Shoulder Abd (C5) 4-    Shoulder ER 4-    Shoulder IR 4+   Biceps (C6) 5   Triceps (C7)  4+         RESTRICTIONS/PRECAUTIONS: None    Exercises/Interventions:   Therapeutic Exercise (70657) Resistance / level Sets / Seconds  Reps Notes/Cues   Arm bike 2' forward  2' backward 4'  Added     Added             10\" 10 Added    Porfirio ERlime 2 10    Added      Added    added    ^   Added    ^    blue 2 10 Added    blue 2 10 Added    Added     10\" 10 Added     10\" 10x  Added 11/18     Prone rows 3# 3 10 Added 11/30   Cueing to limit shoulder elevation   Prone extension 3# 3 10    sidelying ER 3# 3 10 Cueing for full range and proper technique   Sleeper stretch  10\" 10           Cable column lat pull downs  5 plates 6F83 Added 1/44   Cable column rows  5 plates 3W34 Added 9/80   Cable column ER  1 plate 1C94 Added 9/87   Cable column IR  2 plates 3P40 Added 2/49                 Therapeutic Activities (22729)                                          Neuromuscular Re-ed (09182)       Clinician assist on scap to improve upward rotation and depression    Added 11/30 - cueing for improved control   Table top pushups 2 10 Added 12/14         Prone horizontal abd 2 10 Manual assist for full range initially, tactile facilitation          Manual Intervention (73729)       Shld /GH Mobs GII/III posterior & inferior 2'  Added 11/4   STM / trigger point release Teres/lat  subscap  2'      PROM MT All directions 8'                Pt. Education:  -all pt questions were answered    Home Exercise Program:  Access Code: [email or web address]   URL: [email or web address]   Date: 10/28/2020   Prepared by: Tiffanie Horvath     Exercises   Supine Chin Tuck - 10 reps - 3 sets - 1x daily - 7x weekly   Supine Shoulder Flexion with Dowel - 10 reps - 10\" hold - 1x daily - 7x weekly   Seated Gentle Upper Trapezius Stretch - 5 reps - 20\" hold - 1x daily - 7x weekly   Standing Shoulder External Rotation with Resistance - 10 reps - 3 sets - 1x daily - 7x weekly   Seated Shoulder Flexion Towel Slide at Table Top - 10 reps - 10 sec hold - 1x daily - 7x weekly       Therapeutic Exercise and NMR EXR  [x] (72176) Provided verbal/tactile cueing for activities related to strengthening, flexibility, endurance, ROM  for improvements in scapular, scapulothoracic and UE control with self care, reaching, carrying, lifting, house/yardwork, driving/computer work.    [] (70563) Provided verbal/tactile cueing for activities related to improving balance, coordination, kinesthetic sense, posture, motor skill, proprioception  to assist with  scapular, scapulothoracic and UE control with self care, reaching, carrying, lifting, house/yardwork, driving/computer work.  [] (11150) Therapist is in constant attendance of 2 or more patients providing skilled therapy interventions, but not providing any significant amount of measurable one-on-one time to either patient, for improvements in cervical, scapular, scapulothoracic and UE control with self care, reaching, carrying, lifting, house/yardwork, driving, computer work. Therapeutic Activities:    [] (98719 or 91403) Provided verbal/tactile cueing for activities related to improving balance, coordination, kinesthetic sense, posture, motor skill, proprioception and motor activation to allow for proper function of scapular, scapulothoracic and UE control with self care, carrying, lifting, driving/computer work.      Home Exercise Program:    [x] (24862) Reviewed/Progressed HEP activities related to strengthening, flexibility, endurance, ROM of scapular, scapulothoracic and UE control with self care, reaching, carrying, lifting, house/yardwork, driving/computer work  [] (24714) Reviewed/Progressed HEP activities related to improving balance, coordination, kinesthetic sense, posture, motor skill, proprioception of scapular, scapulothoracic and UE control with self care, reaching, carrying, lifting, house/yardwork, driving/computer work      Manual Treatments:  PROM / STM / Oscillations-Mobs:  G-I, II, III, IV (PA's, Inf., Post.)  [x] (85186) Provided manual therapy to mobilize soft tissue/joints of cervical/CT, scapular GHJ and UE for the purpose of modulating pain, promoting relaxation,  increasing ROM, reducing/eliminating soft tissue swelling/inflammation/restriction, improving soft tissue extensibility and allowing for proper ROM for normal function with self care, reaching, carrying, lifting, house/yardwork, driving/computer work    Modalities:     Charges:  Timed Code Treatment Minutes: 40   Total Treatment Minutes: 45       [] EVAL - LOW (69472)   [] EVAL - MOD (46298)  [] EVAL - HIGH (39950)  [] RE-EVAL (42492)   [x] KZ(82393) x 2       [] Ionto  [] NMR (94093) x      [] Vaso  [x] Manual (35075) x1      [] Ultrasound   [] TA x        [] Mech Traction (31445)  [] Aquatic Therapy x      [] ES (un) (02654):   [] Home Management Training x  [] ES(attended) (31713)   [] Dry Needling 1-2 muscles (89890):  [] Dry Needling 3+ muscles (821268  [] Group:      [] Other:                    GOALS:  Patient stated goal: to get back to doing everything  [x] Progressing: [] Met: [] Not Met: [] Adjusted    Therapist goals for Patient:   Short Term Goals: To be achieved in: 2 weeks  1. Independent in HEP and progression per patient tolerance, in order to prevent re-injury. [] Progressing: [x] Met: [] Not Met: [] Adjusted  2. Patient will have a decrease in pain to facilitate improvement in movement, function, and ADLs as indicated by Functional Deficits. [x] Progressing: [] Met: [] Not Met: [] Adjusted    Long Term Goals: To be achieved in: 6-8 weeks  1. Disability index score of 17% or less for the UEFI or Quick DASH to assist with reaching prior level of function. [x] Progressing: [] Met: [] Not Met: [] Adjusted  2. Patient will demonstrate increased AROM to L shoulder flexion 150*, ABD to 150*, ER to 70*, IR to 40* to allow for proper joint functioning as indicated by patients Functional Deficits. [x] Progressing: [] Met: [] Not Met: [] Adjusted  3. Patient will demonstrate an increase in Strength to 5/5 all shoulder musculature to allow for proper functional mobility as indicated by patients Functional Deficits. [x] Progressing: [] Met: [] Not Met: [] Adjusted  4. Patient will return to functional activities including driving and cooking without increased symptoms or restriction.    [x] Progressing: [] Met: [] Not Met: [] Adjusted  5. Patient will return to household management and work duties without increased pain or restriction. [x] Progressing: [] Met: [] Not Met: [] Adjusted    Overall Progression Towards Functional goals/ Treatment Progress Update:  [] Patient is progressing as expected towards functional goals listed. [x] Progression is slowed due to complexities/Impairments listed. [] Progression has been slowed due to co-morbidities. [] Plan just implemented, too soon to assess goals progression <30days   [] Goals require adjustment due to lack of progress  [] Patient is not progressing as expected and requires additional follow up with physician  [] Other    Persisting Functional Limitations/Impairments:  []Sitting []Standing   []Transfers  [x]Sleeping   []Reaching []Lifting   []ADLs []Housework  []Driving [x]Job related tasks  [x]Sports/Recreation []Other:    ASSESSMENT: Pt. Tolerated therapy today without complaints. Pt. Demonstrates improvements in shoulder mobility and strength. Reviewed additional stretches to continue at home. Patient is currently independent with symptom management and home exercise program. Patient reports understanding of the importance of continued compliance with home exercise program after discharge. Patient should reach all long term goals with compliance to home exercise program upon discharge.       Treatment/Activity Tolerance:  [x] Pt able to complete treatment [] Patient limited by fatique  [] Patient limited by pain  [] Patient limited by other medical complications  [] Other:     Prognosis: [x] Good [] Fair  [] Poor    Patient Requires Follow-up: [] Yes  [x] No    Return to Play:    [x]  N/A   []  Stage 1: Intro to Strength   []  Stage 2: Dynamic Strength and Intro to Plyometrics   []  Stage 3: Advanced Plyometrics and Intro to Throwing   []  Stage 4: Sport specific Training/Return to Sport   []  Ready to Return to Play, [employer] Stages   []  Not Ready for Return to Sports    Comments:      PLAN: D/C to HEP. Pt to call with any questions and f/u with PT prn.  [] Continue per plan of care [] Alter current plan (see comments)  [] Plan of care initiated [] Hold pending MD visit [x] Discharge    Electronically signed by: Liu Truong PT      Note: If patient does not return for scheduled/ recommended follow up visits, this note will serve as a discharge from care along with most recent update on progress.

## 2021-02-05 ENCOUNTER — APPOINTMENT (OUTPATIENT)
Dept: PHYSICAL THERAPY | Age: 61
End: 2021-02-05
Payer: COMMERCIAL

## 2022-04-21 ENCOUNTER — OFFICE VISIT (OUTPATIENT)
Dept: INTERNAL MEDICINE CLINIC | Age: 62
End: 2022-04-21
Payer: COMMERCIAL

## 2022-04-21 VITALS
OXYGEN SATURATION: 99 % | HEIGHT: 65 IN | SYSTOLIC BLOOD PRESSURE: 116 MMHG | WEIGHT: 170 LBS | HEART RATE: 86 BPM | DIASTOLIC BLOOD PRESSURE: 80 MMHG | BODY MASS INDEX: 28.32 KG/M2

## 2022-04-21 DIAGNOSIS — Z00.00 ANNUAL PHYSICAL EXAM: Primary | ICD-10-CM

## 2022-04-21 DIAGNOSIS — Z12.11 COLON CANCER SCREENING: ICD-10-CM

## 2022-04-21 DIAGNOSIS — Z86.59 HISTORY OF DEPRESSION: ICD-10-CM

## 2022-04-21 DIAGNOSIS — Z00.00 ANNUAL PHYSICAL EXAM: ICD-10-CM

## 2022-04-21 DIAGNOSIS — R35.0 FREQUENT URINATION: ICD-10-CM

## 2022-04-21 DIAGNOSIS — Z86.59 HISTORY OF ANXIETY: ICD-10-CM

## 2022-04-21 DIAGNOSIS — E78.2 MIXED HYPERLIPIDEMIA: ICD-10-CM

## 2022-04-21 PROBLEM — G89.29 CHRONIC LEFT SHOULDER PAIN: Status: RESOLVED | Noted: 2020-07-28 | Resolved: 2022-04-21

## 2022-04-21 PROBLEM — M25.512 CHRONIC LEFT SHOULDER PAIN: Status: RESOLVED | Noted: 2020-07-28 | Resolved: 2022-04-21

## 2022-04-21 LAB
A/G RATIO: 2 (ref 1.1–2.2)
ALBUMIN SERPL-MCNC: 4.7 G/DL (ref 3.4–5)
ALP BLD-CCNC: 75 U/L (ref 40–129)
ALT SERPL-CCNC: 16 U/L (ref 10–40)
ANION GAP SERPL CALCULATED.3IONS-SCNC: 13 MMOL/L (ref 3–16)
AST SERPL-CCNC: 20 U/L (ref 15–37)
BILIRUB SERPL-MCNC: 0.3 MG/DL (ref 0–1)
BILIRUBIN, POC: NORMAL
BLOOD URINE, POC: NORMAL
BUN BLDV-MCNC: 12 MG/DL (ref 7–20)
CALCIUM SERPL-MCNC: 9.3 MG/DL (ref 8.3–10.6)
CHLORIDE BLD-SCNC: 102 MMOL/L (ref 99–110)
CHOLESTEROL, TOTAL: 264 MG/DL (ref 0–199)
CLARITY, POC: CLEAR
CO2: 25 MMOL/L (ref 21–32)
COLOR, POC: YELLOW
CREAT SERPL-MCNC: 0.6 MG/DL (ref 0.6–1.1)
GFR AFRICAN AMERICAN: >60
GFR NON-AFRICAN AMERICAN: >60
GLUCOSE BLD-MCNC: 87 MG/DL (ref 70–99)
GLUCOSE URINE, POC: NORMAL
HCT VFR BLD CALC: 39.2 % (ref 36–48)
HDLC SERPL-MCNC: 78 MG/DL (ref 40–60)
HEMOGLOBIN: 13.2 G/DL (ref 12–16)
KETONES, POC: NORMAL
LDL CHOLESTEROL CALCULATED: 173 MG/DL
LEUKOCYTE EST, POC: NORMAL
MCH RBC QN AUTO: 31.9 PG (ref 26–34)
MCHC RBC AUTO-ENTMCNC: 33.8 G/DL (ref 31–36)
MCV RBC AUTO: 94.5 FL (ref 80–100)
NITRITE, POC: NORMAL
PDW BLD-RTO: 13.3 % (ref 12.4–15.4)
PH, POC: 6.5
PLATELET # BLD: 281 K/UL (ref 135–450)
PMV BLD AUTO: 7.3 FL (ref 5–10.5)
POTASSIUM SERPL-SCNC: 4.1 MMOL/L (ref 3.5–5.1)
PROTEIN, POC: >=300
RBC # BLD: 4.15 M/UL (ref 4–5.2)
SODIUM BLD-SCNC: 140 MMOL/L (ref 136–145)
SPECIFIC GRAVITY, POC: 1.02
TOTAL PROTEIN: 7 G/DL (ref 6.4–8.2)
TRIGL SERPL-MCNC: 63 MG/DL (ref 0–150)
TSH REFLEX FT4: 0.84 UIU/ML (ref 0.27–4.2)
UROBILINOGEN, POC: 0.2
VITAMIN D 25-HYDROXY: 36.6 NG/ML
VLDLC SERPL CALC-MCNC: 13 MG/DL
WBC # BLD: 7.7 K/UL (ref 4–11)

## 2022-04-21 PROCEDURE — 99396 PREV VISIT EST AGE 40-64: CPT | Performed by: NURSE PRACTITIONER

## 2022-04-21 PROCEDURE — 99212 OFFICE O/P EST SF 10 MIN: CPT | Performed by: NURSE PRACTITIONER

## 2022-04-21 PROCEDURE — 81002 URINALYSIS NONAUTO W/O SCOPE: CPT | Performed by: NURSE PRACTITIONER

## 2022-04-21 RX ORDER — NITROFURANTOIN 25; 75 MG/1; MG/1
100 CAPSULE ORAL 2 TIMES DAILY
Qty: 10 CAPSULE | Refills: 0 | Status: SHIPPED | OUTPATIENT
Start: 2022-04-21 | End: 2022-04-26

## 2022-04-21 SDOH — ECONOMIC STABILITY: FOOD INSECURITY: WITHIN THE PAST 12 MONTHS, THE FOOD YOU BOUGHT JUST DIDN'T LAST AND YOU DIDN'T HAVE MONEY TO GET MORE.: NEVER TRUE

## 2022-04-21 SDOH — ECONOMIC STABILITY: FOOD INSECURITY: WITHIN THE PAST 12 MONTHS, YOU WORRIED THAT YOUR FOOD WOULD RUN OUT BEFORE YOU GOT MONEY TO BUY MORE.: NEVER TRUE

## 2022-04-21 ASSESSMENT — PATIENT HEALTH QUESTIONNAIRE - PHQ9
4. FEELING TIRED OR HAVING LITTLE ENERGY: 0
10. IF YOU CHECKED OFF ANY PROBLEMS, HOW DIFFICULT HAVE THESE PROBLEMS MADE IT FOR YOU TO DO YOUR WORK, TAKE CARE OF THINGS AT HOME, OR GET ALONG WITH OTHER PEOPLE: 0
SUM OF ALL RESPONSES TO PHQ QUESTIONS 1-9: 0
SUM OF ALL RESPONSES TO PHQ QUESTIONS 1-9: 0
8. MOVING OR SPEAKING SO SLOWLY THAT OTHER PEOPLE COULD HAVE NOTICED. OR THE OPPOSITE, BEING SO FIGETY OR RESTLESS THAT YOU HAVE BEEN MOVING AROUND A LOT MORE THAN USUAL: 0
7. TROUBLE CONCENTRATING ON THINGS, SUCH AS READING THE NEWSPAPER OR WATCHING TELEVISION: 0
2. FEELING DOWN, DEPRESSED OR HOPELESS: 0
SUM OF ALL RESPONSES TO PHQ9 QUESTIONS 1 & 2: 0
SUM OF ALL RESPONSES TO PHQ QUESTIONS 1-9: 0
3. TROUBLE FALLING OR STAYING ASLEEP: 0
6. FEELING BAD ABOUT YOURSELF - OR THAT YOU ARE A FAILURE OR HAVE LET YOURSELF OR YOUR FAMILY DOWN: 0
9. THOUGHTS THAT YOU WOULD BE BETTER OFF DEAD, OR OF HURTING YOURSELF: 0
1. LITTLE INTEREST OR PLEASURE IN DOING THINGS: 0
SUM OF ALL RESPONSES TO PHQ QUESTIONS 1-9: 0
5. POOR APPETITE OR OVEREATING: 0

## 2022-04-21 ASSESSMENT — SOCIAL DETERMINANTS OF HEALTH (SDOH): HOW HARD IS IT FOR YOU TO PAY FOR THE VERY BASICS LIKE FOOD, HOUSING, MEDICAL CARE, AND HEATING?: NOT HARD AT ALL

## 2022-04-21 NOTE — PROGRESS NOTES
Normal rate and regular rhythm. Heart sounds: Normal heart sounds. No murmur heard. Pulmonary:      Effort: Pulmonary effort is normal. No respiratory distress. Breath sounds: Normal breath sounds. No wheezing or rhonchi. Skin:     General: Skin is warm and dry. Neurological:      General: No focal deficit present. Mental Status: She is alert and oriented to person, place, and time. Psychiatric:         Mood and Affect: Mood and affect normal.         Behavior: Behavior normal.         Assessment/Plan:  1. Annual physical exam  Overall doing well and feeling well  Checking blood work  - Lipid Panel; Future  - Comprehensive Metabolic Panel; Future  - TSH with Reflex to FT4; Future  - Vitamin D 25 Hydroxy; Future  - CBC; Future    2. Frequent urination  Acute, uncontrolled  UA consistent with UTI  Sending for culture  Treating with antibiotics  Increase water intake  - POCT Urinalysis no Micro  - Culture, Urine  - nitrofurantoin, macrocrystal-monohydrate, (MACROBID) 100 MG capsule; Take 1 capsule by mouth 2 times daily for 5 days  Dispense: 10 capsule; Refill: 0    3. Mixed hyperlipidemia  Due for blood work    4. Colon cancer screening  - POCT Fecal Immunochemical Test (FIT); Future    Discussed medications with patient, who voiced understanding of their use and indications. All questions answered. No follow-ups on file.       Electronically signed by MANUELITO Kraft CNP on 4/22/2022 at 5:07 PM

## 2022-04-22 PROBLEM — Z86.59 HISTORY OF ANXIETY: Status: ACTIVE | Noted: 2022-04-22

## 2022-04-22 PROBLEM — Z86.59 HISTORY OF DEPRESSION: Status: ACTIVE | Noted: 2022-04-22

## 2022-04-22 ASSESSMENT — ENCOUNTER SYMPTOMS
WHEEZING: 0
COUGH: 0
CHEST TIGHTNESS: 0
SHORTNESS OF BREATH: 0

## 2022-04-23 LAB
ORGANISM: ABNORMAL
URINE CULTURE, ROUTINE: ABNORMAL

## 2022-05-02 DIAGNOSIS — Z12.11 COLON CANCER SCREENING: ICD-10-CM

## 2022-05-02 LAB
CONTROL: NORMAL
HEMOCCULT STL QL: NEGATIVE

## 2022-05-02 PROCEDURE — 82274 ASSAY TEST FOR BLOOD FECAL: CPT | Performed by: NURSE PRACTITIONER

## 2022-06-10 ENCOUNTER — HOSPITAL ENCOUNTER (EMERGENCY)
Age: 62
Discharge: HOME | End: 2022-06-10
Attending: EMERGENCY MEDICINE
Payer: COMMERCIAL

## 2022-06-10 ENCOUNTER — APPOINTMENT (OUTPATIENT)
Dept: GENERAL RADIOLOGY | Age: 62
End: 2022-06-10
Payer: COMMERCIAL

## 2022-06-10 VITALS
RESPIRATION RATE: 18 BRPM | TEMPERATURE: 98.5 F | OXYGEN SATURATION: 97 % | BODY MASS INDEX: 28.91 KG/M2 | DIASTOLIC BLOOD PRESSURE: 86 MMHG | HEIGHT: 65 IN | SYSTOLIC BLOOD PRESSURE: 143 MMHG | HEART RATE: 77 BPM | WEIGHT: 173.5 LBS

## 2022-06-10 DIAGNOSIS — S82.832A CLOSED FRACTURE OF DISTAL END OF LEFT FIBULA, UNSPECIFIED FRACTURE MORPHOLOGY, INITIAL ENCOUNTER: Primary | ICD-10-CM

## 2022-06-10 PROCEDURE — 73610 X-RAY EXAM OF ANKLE: CPT

## 2022-06-10 PROCEDURE — 6370000000 HC RX 637 (ALT 250 FOR IP): Performed by: EMERGENCY MEDICINE

## 2022-06-10 PROCEDURE — 29515 APPLICATION SHORT LEG SPLINT: CPT

## 2022-06-10 PROCEDURE — 99283 EMERGENCY DEPT VISIT LOW MDM: CPT

## 2022-06-10 RX ORDER — NAPROXEN 250 MG/1
500 TABLET ORAL ONCE
Status: COMPLETED | OUTPATIENT
Start: 2022-06-10 | End: 2022-06-10

## 2022-06-10 RX ORDER — OXYCODONE HYDROCHLORIDE AND ACETAMINOPHEN 5; 325 MG/1; MG/1
1 TABLET ORAL ONCE
Status: COMPLETED | OUTPATIENT
Start: 2022-06-10 | End: 2022-06-10

## 2022-06-10 RX ORDER — OXYCODONE HYDROCHLORIDE AND ACETAMINOPHEN 5; 325 MG/1; MG/1
1 TABLET ORAL EVERY 6 HOURS PRN
Qty: 20 TABLET | Refills: 0 | Status: SHIPPED | OUTPATIENT
Start: 2022-06-10 | End: 2022-06-15

## 2022-06-10 RX ADMIN — NAPROXEN 500 MG: 250 TABLET ORAL at 17:54

## 2022-06-10 RX ADMIN — OXYCODONE AND ACETAMINOPHEN 1 TABLET: 5; 325 TABLET ORAL at 17:54

## 2022-06-10 RX ADMIN — OXYCODONE AND ACETAMINOPHEN 1 TABLET: 5; 325 TABLET ORAL at 17:09

## 2022-06-10 ASSESSMENT — PAIN SCALES - GENERAL
PAINLEVEL_OUTOF10: 7
PAINLEVEL_OUTOF10: 5
PAINLEVEL_OUTOF10: 7
PAINLEVEL_OUTOF10: 5

## 2022-06-10 ASSESSMENT — PAIN DESCRIPTION - DESCRIPTORS: DESCRIPTORS: ACHING

## 2022-06-10 ASSESSMENT — PAIN DESCRIPTION - PAIN TYPE: TYPE: ACUTE PAIN

## 2022-06-10 ASSESSMENT — PAIN DESCRIPTION - ORIENTATION
ORIENTATION: LEFT

## 2022-06-10 ASSESSMENT — PAIN DESCRIPTION - LOCATION
LOCATION: ANKLE

## 2022-06-10 ASSESSMENT — PAIN - FUNCTIONAL ASSESSMENT
PAIN_FUNCTIONAL_ASSESSMENT: 0-10
PAIN_FUNCTIONAL_ASSESSMENT: 0-10

## 2022-06-10 NOTE — ED NOTES
Short OCL splint placed on left foot. Dr. Alejandro Toussaint checking splint. Patient tolerated well.        Jonah Kelley, ERICK  06/10/22 265 [facility], RN  06/10/22 [id number]

## 2022-06-10 NOTE — ED PROVIDER NOTES
[address] [facility]      CHIEF COMPLAINT  Ankle Pain (Left ankle rolled. Unable to weightbear.  )       HISTORY OF PRESENT ILLNESS  Alisson Schaeffer is a 63 y.o. female  who presents to the ED complaining of left ankle pain. Patient was in the garage and stepped off of a ledge and rolled her left ankle. She did not hit her head, lose consciousness or injure anything else during this event.  came out to find her try to get up the stairs and patient has been having significant pain since the event. Patient denies any numbness or tingling or any weakness but is unable to stand on the ankle secondary to the pain. No pain to the knee or hip. No neck or back pain. She states that remotely she had previously sprained that ankle but no previous surgery to that ankle. She has not taken anything for the pain as this happened shortly prior to arrival.    No other complaints, modifying factors or associated symptoms. I have reviewed the following from the nursing documentation. Past Medical History:   Diagnosis Date    Anxiety     Anxiety 2016    Dysthymic disorder 4/10/2012    Last Assessment & Plan:  Formatting of this note might be different from the original. Start Sertraline 50 mg 1/2 tab po qd x 1 wk and then can go up to 50 mg qd.     Hyperlipidemia      Past Surgical History:   Procedure Laterality Date    BREAST ENHANCEMENT SURGERY      age 38   24 \[facility]\"" TONSILLECTOMY AND ADENOIDECTOMY       Family History   Problem Relation Age of Onset    Heart Disease Mother         MI, cardiomyopathy    High Blood Pressure Mother     Other Father     Heart Disease Sister     Heart Disease Brother         MI,  at age 45    Cancer Maternal Grandfather      Social History     Socioeconomic History    Marital status:      Spouse name: Not on file    Number of children: 3    Years of education: Not on file    Highest education level: Not on file   Occupational History    Occupation: coffee shop owner   Tobacco Use    Smoking status: Former Smoker     Packs/day: 0.50     Years: 30.00     Pack years: 15.00     Quit date:      Years since quittin.4    Smokeless tobacco: Never Used   Vaping Use    Vaping Use: Never used   Substance and Sexual Activity    Alcohol use: Yes     Comment: patient has 2 drinks a night, wine or liquor    Drug use: No    Sexual activity: Not Currently     Partners: Male     Comment:    Other Topics Concern    Not on file   Social History Narrative    Not on file     Social Determinants of Health     Financial Resource Strain: Low Risk     Difficulty of Paying Living Expenses: Not hard at all   Food Insecurity: No Food Insecurity    Worried About Running Out of Food in the Last Year: Never true    Zechariah of Food in the Last Year: Never true   Transportation Needs:     Lack of Transportation (Medical): Not on file    Lack of Transportation (Non-Medical):  Not on file   Physical Activity:     Days of Exercise per Week: Not on file    Minutes of Exercise per Session: Not on file   Stress:     Feeling of Stress : Not on file   Social Connections:     Frequency of Communication with Friends and Family: Not on file    Frequency of Social Gatherings with Friends and Family: Not on file    Attends Taoism Services: Not on file    Active Member of [address] [place] [address] or Organizations: Not on file    Attends Club or Organization Meetings: Not on file    Marital Status: Not on file   Intimate Partner Violence:     Fear of Current or Ex-Partner: Not on file    Emotionally Abused: Not on file    Physically Abused: Not on file    Sexually Abused: Not on file   Housing Stability:     Unable to Pay for Housing in the Last Year: Not on file    Number of [place] in the Last Year: Not on file    Unstable Housing in the Last Year: Not on file     Current Facility-Administered Medications   Medication Dose Route Frequency Provider Last Rate Last Admin    oxyCODONE-acetaminophen (PERCOCET) 5-325 MG per tablet 1 tablet  1 tablet Oral Once Ron Villanueva MD        naproxen (NAPROSYN) tablet 500 mg  500 mg Oral Once Ron Villanueva MD         Current Outpatient Medications   Medication Sig Dispense Refill    oxyCODONE-acetaminophen (PERCOCET) 5-325 MG per tablet Take 1 tablet by mouth every 6 hours as needed for Pain for up to 5 days. 20 tablet 0     No Known Allergies    REVIEW OF SYSTEMS  8 systems reviewed, pertinent positives per HPI otherwise noted to be negative. PHYSICAL EXAM  /70   Pulse 82   Temp 98.5 °F (36.9 °C) (Tympanic)   Resp 16   Ht 5' 5\" (1.651 m)   Wt 173 lb 8 oz (78.7 kg)   SpO2 97%   BMI 28.87 kg/m²    GENERAL APPEARANCE: Awake and alert. Cooperative. No acute distress. HENT: Normocephalic. Atraumatic. Mucous membranes are moist.  No drooling or stridor. NECK: Supple. EYES: PERRL. EOM's grossly intact. HEART/CHEST: RRR. No murmurs. 2+ DP and PT pulses. LUNGS: Respirations unlabored. CTAB. Good air exchange. Speaking comfortably in full sentences. ABDOMEN: No tenderness. Soft. Non-distended. No masses. No organomegaly. No guarding or rebound. MUSCULOSKELETAL: Compartments soft. No deformity. Patient with tenderness noted over the lateral malleolus of the left ankle soft tissue swelling associated with. No open abrasions or lacerations. No tenderness in the midfoot or along the fifth metatarsal of the left foot. Achilles palpated without any noted tenderness over this area and negative Manrique sign is noted. Minimal discomfort with palpation of the medial malleolus on the left. No tenderness proximally within the tibia/fibula or elsewhere in the bilateral lower extremities other than the left ankle tenderness noted above. .  All extremities neurovascularly intact. SKIN: Warm and dry. No acute rashes. NEUROLOGICAL: Alert and oriented. CN's 2-12 intact. No gross facial drooping.  Strength 5/5, sensation intact. Specifically, motor and sensory intact distally in the left lower extremity. PSYCHIATRIC: Normal mood and affect. LABS  I have reviewed all labs for this visit. No results found for this visit on 06/10/22. RADIOLOGY  XR ANKLE LEFT (MIN 3 VIEWS)    Result Date: 6/10/2022  EXAM: XR Left Ankle Complete, 3 or More Views EXAM DATE/TIME: 6/10/2022 5:07 pm CLINICAL HISTORY: ORDERING SYSTEM PROVIDED  pain especially lateral mal s/p \"rolling\" ankle TECHNOLOGIST PROVIDED HISTORY:  Reason for exam:->pain especially lateral mal s/p \"rolling\" ankle  Reason for Exam: lateral ankle pain and swelling s/p rolling injury TECHNIQUE: Frontal, lateral and oblique views of the left ankle. COMPARISON: No relevant prior studies available. FINDINGS: Bones/joints:  Nondisplaced lateral malleolar fracture. Probable chronic ossification anterior to the tibia near the articular surface on the lateral view though chip fracture is not excluded. No dislocation. Soft tissues:  Lateral and anterior ankle soft tissue swelling. 1.  Nondisplaced lateral malleolar fracture. 2.  Lateral and anterior ankle soft tissue swelling. 3.  Probable chronic ossification anterior to the tibia near the articular surface on the lateral view though chip fracture is not excluded. ED COURSE/MDM  Patient seen and evaluated. Old records reviewed. Imaging reviewed and results discussed with patient. Patient presenting for evaluation of left ankle injury and noted to have significant lateral soft tissue swelling with tenderness localized to this area. Ice has already been applied at time of my initial evaluation. She is neurovascular intact. No other injuries noted. She was initially given 1 Percocet for pain control with only mild improvement of her pain so additional 1 Percocet was given along with Naprosyn. Plain films notable for nondisplaced lateral malleolus fracture.   Also possible ossification anterior to the tibia and she does not have significant tenderness to this area so I suspect that is more likely to be the ossification that is chronic but cannot rule out a chip fracture. Also given the degree of her continued pain, and a do not feel that she would tolerate being placed in an orthotic boot therefore I will place her in a short leg OCL splint and have her follow-up closely with orthopedics. Patient is given crutches and may weight-bear as tolerated and is to call orthopedist for follow-up appointment. Reasons to return to the ER were discussed and all questions answered at time of discharge. I estimate there is LOW risk for COMPARTMENT SYNDROME, DEEP VENOUS THROMBOSIS, SEPTIC ARTHRITIS, TENDON OR NEUROVASCULAR INJURY, thus I consider the discharge disposition reasonable. Myles Price and I have discussed the diagnosis and risks, and we agree with discharging home to follow-up with their primary doctor or the referral orthopedist. We also discussed returning to the Emergency Department immediately if new or worsening symptoms occur. We have discussed the symptoms which are most concerning (e.g., changing or worsening pain, numbness, weakness) that necessitate immediate return. Is this patient to be included in the SEP-1 Core Measure? No   Exclusion criteria - the patient is NOT to be included for SEP-1 Core Measure due to: Infection is not suspected     During the patient's ED course, the patient was given:  Medications   oxyCODONE-acetaminophen (PERCOCET) 5-325 MG per tablet 1 tablet (has no administration in time range)   naproxen (NAPROSYN) tablet 500 mg (has no administration in time range)   oxyCODONE-acetaminophen (PERCOCET) 5-325 MG per tablet 1 tablet (1 tablet Oral Given 6/10/22 1709)        CLINICAL IMPRESSION  1.  Closed fracture of distal end of left fibula, unspecified fracture morphology, initial encounter        Blood pressure 116/70, pulse 82, temperature 98.5 °F (36.9 °C), temperature source Tympanic, resp. rate 16, height 5' 5\" (1.651 m), weight 173 lb 8 oz (78.7 kg), SpO2 97 %, not currently breastfeeding. Itzel Smith was discharged to home in stable condition. Patient was given scripts for the following medications. I counseled patient how to take these medications. New Prescriptions    OXYCODONE-ACETAMINOPHEN (PERCOCET) 5-325 MG PER TABLET    Take 1 tablet by mouth every 6 hours as needed for Pain for up to 5 days. Follow-up with:  Camille Grace MD  [address]  [address]  [address] [place] [address]  [phone]    Schedule an appointment as soon as possible for a visit   to follow up with orthopedics    Kaur Landon, APRN - [facility]  [address]  [phone]      As needed      DISCLAIMER: This chart was created using Dragon dictation software. Efforts were made by me to ensure accuracy, however some errors may be present due to limitations of this technology and occasionally words are not transcribed correctly.         Francisco Downey MD  06/10/22 5494

## 2022-06-10 NOTE — ED NOTES
Discharge instructions reviewed. Patient verbalized understanding. Patient will follow up with orthopedics. Discharged per wheelchair.      Anil Hunter RN  06/10/22 [address], RN  06/10/22 0486

## 2022-06-10 NOTE — ED NOTES
Left ankle painful and slightly swollen. Patient stated has increased amount of pain. Patient has no loss of sensation. Brisk cap refill. Dr. Kim Castrejon told about patient increase in pain and patient was crying.   Order will be placed per Dr. Hailey Kumar RN  06/10/22 5065

## 2022-06-10 NOTE — ED NOTES
Patient instructed on using crutches. Patient tolerated well.        Thais Silverman RN  06/10/22 3308

## 2022-06-10 NOTE — ED TRIAGE NOTES
Patient came to Er with complaints of left ankle injury. Patient attempting to step over objects and rolled left ankle. Unable to weight bear after ankle injury.

## 2022-06-13 ENCOUNTER — TELEPHONE (OUTPATIENT)
Dept: ORTHOPEDIC SURGERY | Age: 62
End: 2022-06-13

## 2022-06-13 ENCOUNTER — OFFICE VISIT (OUTPATIENT)
Dept: ORTHOPEDIC SURGERY | Age: 62
End: 2022-06-13
Payer: COMMERCIAL

## 2022-06-13 VITALS — HEIGHT: 65 IN | BODY MASS INDEX: 28.82 KG/M2 | WEIGHT: 173 LBS

## 2022-06-13 DIAGNOSIS — M25.572 LEFT ANKLE PAIN, UNSPECIFIED CHRONICITY: Primary | ICD-10-CM

## 2022-06-13 PROCEDURE — 99204 OFFICE O/P NEW MOD 45 MIN: CPT | Performed by: ORTHOPAEDIC SURGERY

## 2022-06-13 PROCEDURE — L4361 PNEUMA/VAC WALK BOOT PRE OTS: HCPCS | Performed by: ORTHOPAEDIC SURGERY

## 2022-06-13 RX ORDER — TRAMADOL HYDROCHLORIDE 50 MG/1
50 TABLET ORAL EVERY 6 HOURS PRN
Qty: 30 TABLET | Refills: 0 | Status: SHIPPED | OUTPATIENT
Start: 2022-06-13 | End: 2022-06-20

## 2022-06-13 NOTE — Clinical Note
Dear Burak Joiner,    I had the pleasure of evaluating your patient in my office today. Please see the attached note for full details of the visit.     With kind regards,  Oswaldo Greer MD

## 2022-06-21 ENCOUNTER — OFFICE VISIT (OUTPATIENT)
Dept: ORTHOPEDIC SURGERY | Age: 62
End: 2022-06-21

## 2022-06-21 VITALS — WEIGHT: 173 LBS | HEIGHT: 65 IN | BODY MASS INDEX: 28.82 KG/M2

## 2022-06-21 DIAGNOSIS — S82.892D CLOSED FRACTURE OF LEFT ANKLE WITH ROUTINE HEALING, SUBSEQUENT ENCOUNTER: Primary | ICD-10-CM

## 2022-06-21 PROCEDURE — 99213 OFFICE O/P EST LOW 20 MIN: CPT | Performed by: ORTHOPAEDIC SURGERY

## 2022-06-21 NOTE — PROGRESS NOTES
6/21/2022     Reason for visit:  Left ankle fracture sustained on 6/10/2022    History of Present Illness: The patient is a 80-year-old female who presents for evaluation of her left ankle. She reports rolling her ankle when coming down from a ledge on 6/10/2022. This resulted in a distal fibula fracture. At the last visit I did recommend a cam walker boot. She does report less pain. Objective:  Ht 5' 5\" (1.651 m)   Wt 173 lb (78.5 kg)   BMI 28.79 kg/m²      Physical Exam:  The patient is well-appearing and in no apparent distress  Examination of the left ankle  Diffuse swelling is present with tenderness over the lateral malleolus, no gross abnormality, compartments are soft  Sensation is intact to light touch throughout all distributions  There is no calf swelling or tenderness  Palpable DP pulse, brisk cap refill, 2+ symmetric reflexes    Imaging:  3 view x-rays of the left ankle were obtained in office today on 6/21/2022 and reviewed. There is a transverse nondisplaced fracture of the distal fibula below the level of the ankle mortise with interval healing present      Assessment:  Left distal fibula fracture sustained on 6/10/2022    Plan:  The patient is doing well overall. She will continue weightbearing as tolerated in the cam walker boot. Return to see me in 4 weeks. Repeat x-rays at that time. Greater than 20 minutes were spent with this encounter. Time spent included evaluating the patient's chart prior to arrival.  Evaluating the patient in the office including history, physical examination, imaging reviewing, and counseling on next steps. Lastly, time was spent discussing orders with my staff as well as providing documentation in the chart.                        Alcides Infante MD            Orthopaedic Surgery Sports Medicine and [address] [place] [address]            Team Physician [facility] ([place]      Disclaimer: This note was dictated with voice recognition software. Though review and correction are routine, we apologize for any errors.

## 2022-07-21 ENCOUNTER — OFFICE VISIT (OUTPATIENT)
Dept: ORTHOPEDIC SURGERY | Age: 62
End: 2022-07-21
Payer: COMMERCIAL

## 2022-07-21 VITALS — HEIGHT: 65 IN | BODY MASS INDEX: 28.82 KG/M2 | WEIGHT: 173 LBS

## 2022-07-21 DIAGNOSIS — S82.892D CLOSED FRACTURE OF LEFT ANKLE WITH ROUTINE HEALING, SUBSEQUENT ENCOUNTER: Primary | ICD-10-CM

## 2022-07-21 PROCEDURE — 99213 OFFICE O/P EST LOW 20 MIN: CPT | Performed by: ORTHOPAEDIC SURGERY

## 2022-07-25 NOTE — PROGRESS NOTES
7/21/2022     Reason for visit:  Left ankle fracture sustained on 6/10/2022    History of Present Illness: The patient is a 42-year-old female who presents for evaluation of her left ankle. She reports rolling her ankle when coming down from a ledge on 6/10/2022. This resulted in a distal fibula fracture. She has been treated nonoperatively in a cam walker boot. She currently reports no pain and is started to try wearing regular shoes. Objective:  Ht 5' 5\" (1.651 m)   Wt 173 lb (78.5 kg)   BMI 28.79 kg/m²      Physical Exam:  The patient is well-appearing and in no apparent distress  Examination of the left ankle  No tenderness over the lateral malleolus, no gross abnormality, compartments are soft  Sensation is intact to light touch throughout all distributions  There is no calf swelling or tenderness  Palpable DP pulse, brisk cap refill, 2+ symmetric reflexes    Imaging:  3 view x-rays of the left ankle were obtained in office today on 7/21/2022 and reviewed. There is a transverse nondisplaced fracture of the distal fibula below the level of the ankle mortise with interval healing present      Assessment:  Left distal fibula fracture sustained on 6/10/2022    Plan:  The patient is doing well. At this point she will transition to normal shoes. She will return to see me as needed in the future. I offered physical therapy but she would like to hold off for now. Greater than 20 minutes were spent with this encounter. Time spent included evaluating the patient's chart prior to arrival.  Evaluating the patient in the office including history, physical examination, imaging reviewing, and counseling on next steps. Lastly, time was spent discussing orders with my staff as well as providing documentation in the chart.                        Reagan Mendoza MD            Orthopaedic Surgery Sports Medicine and [address] [place] [address] Team Physician [facility] ([place]      Disclaimer: This note was dictated with voice recognition software. Though review and correction are routine, we apologize for any errors.

## 2023-03-15 ENCOUNTER — OFFICE VISIT (OUTPATIENT)
Dept: INTERNAL MEDICINE CLINIC | Age: 63
End: 2023-03-15

## 2023-03-15 VITALS
DIASTOLIC BLOOD PRESSURE: 86 MMHG | OXYGEN SATURATION: 99 % | WEIGHT: 173 LBS | HEART RATE: 88 BPM | SYSTOLIC BLOOD PRESSURE: 138 MMHG | BODY MASS INDEX: 28.79 KG/M2

## 2023-03-15 DIAGNOSIS — Z12.31 ENCOUNTER FOR SCREENING MAMMOGRAM FOR BREAST CANCER: ICD-10-CM

## 2023-03-15 DIAGNOSIS — E78.2 MIXED HYPERLIPIDEMIA: ICD-10-CM

## 2023-03-15 DIAGNOSIS — E66.3 OVERWEIGHT: ICD-10-CM

## 2023-03-15 DIAGNOSIS — Z01.419 ENCOUNTER FOR GYNECOLOGICAL EXAMINATION WITHOUT ABNORMAL FINDING: ICD-10-CM

## 2023-03-15 DIAGNOSIS — Z00.00 ANNUAL PHYSICAL EXAM: Primary | ICD-10-CM

## 2023-03-15 PROBLEM — Z86.59 HISTORY OF ANXIETY: Status: RESOLVED | Noted: 2022-04-22 | Resolved: 2023-03-15

## 2023-03-15 PROBLEM — Z86.59 HISTORY OF DEPRESSION: Status: RESOLVED | Noted: 2022-04-22 | Resolved: 2023-03-15

## 2023-03-15 SDOH — ECONOMIC STABILITY: FOOD INSECURITY: WITHIN THE PAST 12 MONTHS, THE FOOD YOU BOUGHT JUST DIDN'T LAST AND YOU DIDN'T HAVE MONEY TO GET MORE.: NEVER TRUE

## 2023-03-15 SDOH — ECONOMIC STABILITY: INCOME INSECURITY: HOW HARD IS IT FOR YOU TO PAY FOR THE VERY BASICS LIKE FOOD, HOUSING, MEDICAL CARE, AND HEATING?: NOT HARD AT ALL

## 2023-03-15 SDOH — ECONOMIC STABILITY: HOUSING INSECURITY
IN THE LAST 12 MONTHS, WAS THERE A TIME WHEN YOU DID NOT HAVE A STEADY PLACE TO SLEEP OR SLEPT IN A SHELTER (INCLUDING NOW)?: NO

## 2023-03-15 SDOH — ECONOMIC STABILITY: FOOD INSECURITY: WITHIN THE PAST 12 MONTHS, YOU WORRIED THAT YOUR FOOD WOULD RUN OUT BEFORE YOU GOT MONEY TO BUY MORE.: NEVER TRUE

## 2023-03-15 ASSESSMENT — ENCOUNTER SYMPTOMS
SHORTNESS OF BREATH: 0
COUGH: 0
CHEST TIGHTNESS: 0
WHEEZING: 0

## 2023-03-15 ASSESSMENT — PATIENT HEALTH QUESTIONNAIRE - PHQ9
SUM OF ALL RESPONSES TO PHQ QUESTIONS 1-9: 0
SUM OF ALL RESPONSES TO PHQ QUESTIONS 1-9: 0
1. LITTLE INTEREST OR PLEASURE IN DOING THINGS: 0
2. FEELING DOWN, DEPRESSED OR HOPELESS: 0
SUM OF ALL RESPONSES TO PHQ QUESTIONS 1-9: 0
SUM OF ALL RESPONSES TO PHQ9 QUESTIONS 1 & 2: 0
SUM OF ALL RESPONSES TO PHQ QUESTIONS 1-9: 0

## 2023-03-15 NOTE — PROGRESS NOTES
3/15/23     Chief Complaint   Patient presents with    Gynecologic Exam     Due for mammogram.    Weight Gain     Would like to discuss starting Semaglutide     HPI    Here for annual exam and pap smear. Not fasting today for blood work, but will return  Would like to try rybelsus for weight management   She has healthy diet and exercises regularly   Keeps gaining weight despite working on healthy lifestyle   She denies any std concerns or vaginal concerns today     No Known Allergies    Current Outpatient Medications   Medication Sig Dispense Refill    Semaglutide 3 MG TABS Take 1 tablet by mouth daily 30 tablet 0     No current facility-administered medications for this visit. Review of Systems   Constitutional:  Negative for chills, fatigue and fever. Respiratory:  Negative for cough, chest tightness, shortness of breath and wheezing. Cardiovascular:  Negative for chest pain, palpitations and leg swelling. Genitourinary:  Negative for difficulty urinating, dysuria, genital sores, vaginal bleeding, vaginal discharge and vaginal pain. Neurological:  Negative for dizziness, tremors, light-headedness and headaches. Vitals:    03/15/23 1328   BP: 138/86   Pulse: 88   SpO2: 99%   Weight: 173 lb (78.5 kg)      Physical Exam  Constitutional:       General: She is not in acute distress. Appearance: Normal appearance. She is not ill-appearing. HENT:      Head: Normocephalic and atraumatic. Cardiovascular:      Rate and Rhythm: Normal rate and regular rhythm. Pulmonary:      Effort: Pulmonary effort is normal. No respiratory distress. Breath sounds: Normal breath sounds. Abdominal:      General: Bowel sounds are normal.      Palpations: Abdomen is soft. Genitourinary:     Vagina: Normal.      Cervix: Normal.      Uterus: Not tender and no uterine prolapse. Adnexa:         Right: No mass or tenderness. Left: No mass or tenderness.      Skin:     General: Skin is warm and dry.   Neurological:      Mental Status: She is alert and oriented to person, place, and time. Mental status is at baseline. Psychiatric:         Mood and Affect: Mood normal.         Behavior: Behavior normal.     Assessment/Plan:  Annual physical exam/ Encounter for gynecological examination without abnormal finding  Annual exam today   Checking labs when fasting   - Comprehensive Metabolic Panel; Future  - Lipid Panel; Future  - [place] OBTAINING SCREEN PAP SMEAR  - PAP SMEAR    Mixed hyperlipidemia  Diet controlled. Encounter for screening mammogram for breast cancer  - [facility] Digital Screen Bilateral [[id number]]; Future    BMI 28.79 / overweight   Chronic, uncontrolled. Continue working on healthy diet/ exercise. Will try rybelus daily. Will send message with weight in 1 month for dose increase to 7 mg. Discussed medications with patient, who voiced understanding of their use and indications. All questions answered. Return in about 3 months (around 6/15/2023), or if symptoms worsen or fail to improve, for weight check .       Electronically signed by MANUELITO Duncan CNP on 3/15/2023 at 1:55 PM

## 2023-03-16 ENCOUNTER — TELEPHONE (OUTPATIENT)
Dept: INTERNAL MEDICINE CLINIC | Age: 63
End: 2023-03-16

## 2023-03-16 DIAGNOSIS — Z00.00 ANNUAL PHYSICAL EXAM: ICD-10-CM

## 2023-03-16 LAB
ALBUMIN SERPL-MCNC: 4.6 G/DL (ref 3.4–5)
ALBUMIN/GLOB SERPL: 2 {RATIO} (ref 1.1–2.2)
ALP SERPL-CCNC: 50 U/L (ref 40–129)
ALT SERPL-CCNC: 18 U/L (ref 10–40)
ANION GAP SERPL CALCULATED.3IONS-SCNC: 10 MMOL/L (ref 3–16)
AST SERPL-CCNC: 23 U/L (ref 15–37)
BILIRUB SERPL-MCNC: 0.6 MG/DL (ref 0–1)
BUN SERPL-MCNC: 18 MG/DL (ref 7–20)
CALCIUM SERPL-MCNC: 9.5 MG/DL (ref 8.3–10.6)
CHLORIDE SERPL-SCNC: 102 MMOL/L (ref 99–110)
CHOLEST SERPL-MCNC: 286 MG/DL (ref 0–199)
CO2 SERPL-SCNC: 29 MMOL/L (ref 21–32)
CREAT SERPL-MCNC: 0.7 MG/DL (ref 0.6–1.2)
GFR SERPLBLD CREATININE-BSD FMLA CKD-EPI: >60 ML/MIN/{1.73_M2}
GLUCOSE SERPL-MCNC: 91 MG/DL (ref 70–99)
HDLC SERPL-MCNC: 83 MG/DL (ref 40–60)
LDLC SERPL CALC-MCNC: 183 MG/DL
POTASSIUM SERPL-SCNC: 4.5 MMOL/L (ref 3.5–5.1)
PROT SERPL-MCNC: 6.9 G/DL (ref 6.4–8.2)
SODIUM SERPL-SCNC: 141 MMOL/L (ref 136–145)
TRIGL SERPL-MCNC: 99 MG/DL (ref 0–150)
VLDLC SERPL CALC-MCNC: 20 MG/DL

## 2023-03-16 NOTE — TELEPHONE ENCOUNTER
Please complete PA on Semaglutide 3 MG TABS   Diagnosis Association: Adult BMI 28.0-28.9 kg/sq m (Z68.28)  Thanks!

## 2023-03-17 NOTE — TELEPHONE ENCOUNTER
The medication was DENIED; DENIAL letter uploaded to MEDIA. If this requires a response please respond to the pool ( [facility] [address]). Thank you please advise patient.

## 2023-03-20 NOTE — TELEPHONE ENCOUNTER
We discussed it may likely be denied. She can try good rx and coupon codes if she would like.  Thanks, Uche Ferreira

## 2023-04-17 ENCOUNTER — TELEPHONE (OUTPATIENT)
Dept: INTERNAL MEDICINE CLINIC | Age: 63
End: 2023-04-17

## 2024-01-02 ENCOUNTER — PATIENT MESSAGE (OUTPATIENT)
Dept: INTERNAL MEDICINE CLINIC | Age: 64
End: 2024-01-02

## 2024-01-03 NOTE — TELEPHONE ENCOUNTER
From: Salvador Unger  To: Flory Lazar  Sent: 1/2/2024 7:14 PM EST  Subject: concerned about my weight    Hello I was unable to get the weight loss prescription you prescribed for me due to a shortage. Is there anyway you could get me on the new RO my weight is now up to 176 and I eat healthy and am very active. I am really concerned and worried about this If I need to schedule my yearly pap I will be happy to.  Thank you,  Salvador Unger

## 2024-06-20 ENCOUNTER — OFFICE VISIT (OUTPATIENT)
Dept: ORTHOPEDIC SURGERY | Age: 64
End: 2024-06-20
Payer: COMMERCIAL

## 2024-06-20 VITALS — BODY MASS INDEX: 28.83 KG/M2 | HEIGHT: 65 IN | WEIGHT: 173.06 LBS

## 2024-06-20 DIAGNOSIS — M25.562 LEFT KNEE PAIN, UNSPECIFIED CHRONICITY: Primary | ICD-10-CM

## 2024-06-20 PROCEDURE — 99214 OFFICE O/P EST MOD 30 MIN: CPT | Performed by: ORTHOPAEDIC SURGERY

## 2024-06-20 PROCEDURE — 20610 DRAIN/INJ JOINT/BURSA W/O US: CPT | Performed by: ORTHOPAEDIC SURGERY

## 2024-06-20 RX ORDER — METHYLPREDNISOLONE ACETATE 40 MG/ML
40 INJECTION, SUSPENSION INTRA-ARTICULAR; INTRALESIONAL; INTRAMUSCULAR; SOFT TISSUE ONCE
Status: COMPLETED | OUTPATIENT
Start: 2024-06-20 | End: 2024-06-20

## 2024-06-20 RX ORDER — BUPIVACAINE HYDROCHLORIDE 5 MG/ML
4 INJECTION, SOLUTION PERINEURAL ONCE
Status: COMPLETED | OUTPATIENT
Start: 2024-06-20 | End: 2024-06-20

## 2024-06-20 RX ADMIN — METHYLPREDNISOLONE ACETATE 40 MG: 40 INJECTION, SUSPENSION INTRA-ARTICULAR; INTRALESIONAL; INTRAMUSCULAR; SOFT TISSUE at 12:28

## 2024-06-20 RX ADMIN — BUPIVACAINE HYDROCHLORIDE 20 MG: 5 INJECTION, SOLUTION PERINEURAL at 12:27

## 2024-06-26 NOTE — PROGRESS NOTES
2024     Reason for visit:  Left knee pain    History of Present Illness:  The patient is a 63-year-old female who presents for evaluation of her left knee.  I have previously treated her for her ankle.  She reports a few different injuries to her left knee.  The majority of her pain is deep within the knee as well as along the distal lateral aspect of the quadriceps.  Is made worse with everyday activity.  She reports occasional catching locking the knee along with swelling of the knee.    Objective:  Ht 1.651 m (5' 5\")   Wt 78.5 kg (173 lb 1 oz)   BMI 28.80 kg/m²      Physical Exam:  The patient is well-appearing and in no apparent distress  Examination of the left knee   There is a small effusion, no gross deformity or skin changes  Range of motion reveals 0 to 120  Neg lachman, negative posterior drawer, no pain or laxity with varus or valgus stress at 0 degrees and 30 degrees of flexion  + joint line tenderness, tenderness is over the lateral distal quadriceps tendon  5 out of 5 strength throughout distal muscle groups  Sensation is intact to light touch throughout all distributions  There is no calf swelling or tenderness  Palpable DP pulse, brisk cap refill, 2+ symmetric reflexes     Imagin view x-rays of the left knee were obtained the office today on 2024 reviewed.  There is no fracture or dislocation.  Early marginal osteophyte formation of the knee is present.      Assessment:  Left knee pain.  Suspect distal quadriceps tendinosis versus early symptomatic patellofemoral degenerative joint disease    Plan:  I discussed with the patient the diagnosis and treatment options.  We discussed operative and nonoperative management.  At this point I do recommend nonoperative management.  Nonoperative treatment options include activity modification, anti-inflammatory medications, physical therapy, and injections. The patient elected proceed with cortisone injection.  Therefore injection was

## 2024-09-10 ENCOUNTER — TELEPHONE (OUTPATIENT)
Dept: INTERNAL MEDICINE CLINIC | Age: 64
End: 2024-09-10

## 2024-09-10 DIAGNOSIS — R05.9 COUGH, UNSPECIFIED TYPE: Primary | ICD-10-CM

## 2024-09-10 DIAGNOSIS — R53.83 FATIGUE, UNSPECIFIED TYPE: ICD-10-CM

## 2024-09-11 ENCOUNTER — OFFICE VISIT (OUTPATIENT)
Dept: INTERNAL MEDICINE CLINIC | Age: 64
End: 2024-09-11
Payer: COMMERCIAL

## 2024-09-11 VITALS
TEMPERATURE: 98.3 F | WEIGHT: 175.8 LBS | OXYGEN SATURATION: 98 % | BODY MASS INDEX: 29.29 KG/M2 | HEART RATE: 83 BPM | DIASTOLIC BLOOD PRESSURE: 80 MMHG | HEIGHT: 65 IN | SYSTOLIC BLOOD PRESSURE: 118 MMHG

## 2024-09-11 DIAGNOSIS — Z12.11 COLON CANCER SCREENING: ICD-10-CM

## 2024-09-11 DIAGNOSIS — H65.01 NON-RECURRENT ACUTE SEROUS OTITIS MEDIA OF RIGHT EAR: Primary | ICD-10-CM

## 2024-09-11 PROCEDURE — 99213 OFFICE O/P EST LOW 20 MIN: CPT | Performed by: NURSE PRACTITIONER

## 2024-09-11 SDOH — ECONOMIC STABILITY: FOOD INSECURITY: WITHIN THE PAST 12 MONTHS, THE FOOD YOU BOUGHT JUST DIDN'T LAST AND YOU DIDN'T HAVE MONEY TO GET MORE.: NEVER TRUE

## 2024-09-11 SDOH — ECONOMIC STABILITY: FOOD INSECURITY: WITHIN THE PAST 12 MONTHS, YOU WORRIED THAT YOUR FOOD WOULD RUN OUT BEFORE YOU GOT MONEY TO BUY MORE.: NEVER TRUE

## 2024-09-11 SDOH — ECONOMIC STABILITY: INCOME INSECURITY: HOW HARD IS IT FOR YOU TO PAY FOR THE VERY BASICS LIKE FOOD, HOUSING, MEDICAL CARE, AND HEATING?: NOT HARD AT ALL

## 2024-09-11 ASSESSMENT — PATIENT HEALTH QUESTIONNAIRE - PHQ9
SUM OF ALL RESPONSES TO PHQ QUESTIONS 1-9: 9
6. FEELING BAD ABOUT YOURSELF - OR THAT YOU ARE A FAILURE OR HAVE LET YOURSELF OR YOUR FAMILY DOWN: SEVERAL DAYS
10. IF YOU CHECKED OFF ANY PROBLEMS, HOW DIFFICULT HAVE THESE PROBLEMS MADE IT FOR YOU TO DO YOUR WORK, TAKE CARE OF THINGS AT HOME, OR GET ALONG WITH OTHER PEOPLE: SOMEWHAT DIFFICULT
1. LITTLE INTEREST OR PLEASURE IN DOING THINGS: MORE THAN HALF THE DAYS
SUM OF ALL RESPONSES TO PHQ QUESTIONS 1-9: 9
7. TROUBLE CONCENTRATING ON THINGS, SUCH AS READING THE NEWSPAPER OR WATCHING TELEVISION: SEVERAL DAYS
3. TROUBLE FALLING OR STAYING ASLEEP: SEVERAL DAYS
SUM OF ALL RESPONSES TO PHQ9 QUESTIONS 1 & 2: 3
9. THOUGHTS THAT YOU WOULD BE BETTER OFF DEAD, OR OF HURTING YOURSELF: NOT AT ALL
SUM OF ALL RESPONSES TO PHQ QUESTIONS 1-9: 9
5. POOR APPETITE OR OVEREATING: SEVERAL DAYS
4. FEELING TIRED OR HAVING LITTLE ENERGY: SEVERAL DAYS
8. MOVING OR SPEAKING SO SLOWLY THAT OTHER PEOPLE COULD HAVE NOTICED. OR THE OPPOSITE, BEING SO FIGETY OR RESTLESS THAT YOU HAVE BEEN MOVING AROUND A LOT MORE THAN USUAL: SEVERAL DAYS
2. FEELING DOWN, DEPRESSED OR HOPELESS: SEVERAL DAYS
SUM OF ALL RESPONSES TO PHQ QUESTIONS 1-9: 9

## 2024-09-30 ENCOUNTER — TELEPHONE (OUTPATIENT)
Dept: INTERNAL MEDICINE CLINIC | Age: 64
End: 2024-09-30

## 2024-09-30 LAB — NONINV COLON CA DNA+OCC BLD SCRN STL QL: NEGATIVE

## 2024-10-10 ENCOUNTER — OFFICE VISIT (OUTPATIENT)
Dept: INTERNAL MEDICINE CLINIC | Age: 64
End: 2024-10-10

## 2024-10-10 VITALS
BODY MASS INDEX: 29.66 KG/M2 | HEART RATE: 77 BPM | SYSTOLIC BLOOD PRESSURE: 120 MMHG | DIASTOLIC BLOOD PRESSURE: 78 MMHG | WEIGHT: 178 LBS | HEIGHT: 65 IN | OXYGEN SATURATION: 99 %

## 2024-10-10 DIAGNOSIS — G89.29 CHRONIC RIGHT SHOULDER PAIN: ICD-10-CM

## 2024-10-10 DIAGNOSIS — G89.29 CHRONIC RIGHT-SIDED LOW BACK PAIN WITH RIGHT-SIDED SCIATICA: ICD-10-CM

## 2024-10-10 DIAGNOSIS — M54.41 CHRONIC RIGHT-SIDED LOW BACK PAIN WITH RIGHT-SIDED SCIATICA: ICD-10-CM

## 2024-10-10 DIAGNOSIS — Z00.00 ENCOUNTER FOR WELL ADULT EXAM WITHOUT ABNORMAL FINDINGS: ICD-10-CM

## 2024-10-10 DIAGNOSIS — E78.2 MIXED HYPERLIPIDEMIA: ICD-10-CM

## 2024-10-10 DIAGNOSIS — M54.9 UPPER BACK PAIN ON RIGHT SIDE: ICD-10-CM

## 2024-10-10 DIAGNOSIS — Z00.00 ENCOUNTER FOR WELL ADULT EXAM WITHOUT ABNORMAL FINDINGS: Primary | ICD-10-CM

## 2024-10-10 DIAGNOSIS — M25.511 CHRONIC RIGHT SHOULDER PAIN: ICD-10-CM

## 2024-10-10 LAB
ALBUMIN SERPL-MCNC: 4.8 G/DL (ref 3.4–5)
ALBUMIN/GLOB SERPL: 2 {RATIO} (ref 1.1–2.2)
ALP SERPL-CCNC: 66 U/L (ref 40–129)
ALT SERPL-CCNC: 28 U/L (ref 10–40)
ANION GAP SERPL CALCULATED.3IONS-SCNC: 10 MMOL/L (ref 3–16)
AST SERPL-CCNC: 26 U/L (ref 15–37)
BILIRUB SERPL-MCNC: 0.4 MG/DL (ref 0–1)
BUN SERPL-MCNC: 15 MG/DL (ref 7–20)
CALCIUM SERPL-MCNC: 9.7 MG/DL (ref 8.3–10.6)
CHLORIDE SERPL-SCNC: 101 MMOL/L (ref 99–110)
CHOLEST SERPL-MCNC: 340 MG/DL (ref 0–199)
CO2 SERPL-SCNC: 29 MMOL/L (ref 21–32)
CREAT SERPL-MCNC: 0.7 MG/DL (ref 0.6–1.2)
DEPRECATED RDW RBC AUTO: 13.2 % (ref 12.4–15.4)
GFR SERPLBLD CREATININE-BSD FMLA CKD-EPI: >90 ML/MIN/{1.73_M2}
GLUCOSE SERPL-MCNC: 90 MG/DL (ref 70–99)
HCT VFR BLD AUTO: 41.4 % (ref 36–48)
HDLC SERPL-MCNC: 82 MG/DL (ref 40–60)
HGB BLD-MCNC: 13.8 G/DL (ref 12–16)
LDLC SERPL CALC-MCNC: 242 MG/DL
MAMMOGRAPHY, EXTERNAL: NORMAL
MCH RBC QN AUTO: 32.4 PG (ref 26–34)
MCHC RBC AUTO-ENTMCNC: 33.4 G/DL (ref 31–36)
MCV RBC AUTO: 97.1 FL (ref 80–100)
PLATELET # BLD AUTO: 304 K/UL (ref 135–450)
PMV BLD AUTO: 7.1 FL (ref 5–10.5)
POTASSIUM SERPL-SCNC: 5 MMOL/L (ref 3.5–5.1)
PROT SERPL-MCNC: 7.2 G/DL (ref 6.4–8.2)
RBC # BLD AUTO: 4.27 M/UL (ref 4–5.2)
SODIUM SERPL-SCNC: 140 MMOL/L (ref 136–145)
TRIGL SERPL-MCNC: 81 MG/DL (ref 0–150)
TSH SERPL DL<=0.005 MIU/L-ACNC: 0.62 UIU/ML (ref 0.27–4.2)
VLDLC SERPL CALC-MCNC: 16 MG/DL
WBC # BLD AUTO: 5.2 K/UL (ref 4–11)

## 2024-10-10 RX ORDER — METHYLPREDNISOLONE 4 MG
TABLET, DOSE PACK ORAL
Qty: 1 KIT | Refills: 1 | Status: SHIPPED | OUTPATIENT
Start: 2024-10-10 | End: 2024-10-16

## 2024-10-13 LAB — B BURGDOR.VLSE1+PEPC10 AB SER IA-ACNC: 0.54 IV

## 2024-10-15 DIAGNOSIS — E78.2 MIXED HYPERLIPIDEMIA: Primary | ICD-10-CM

## 2024-10-15 RX ORDER — ROSUVASTATIN CALCIUM 10 MG/1
10 TABLET, COATED ORAL NIGHTLY
Qty: 90 TABLET | Refills: 1 | Status: SHIPPED | OUTPATIENT
Start: 2024-10-15

## 2024-11-11 ENCOUNTER — PATIENT MESSAGE (OUTPATIENT)
Dept: INTERNAL MEDICINE CLINIC | Age: 64
End: 2024-11-11

## 2024-11-12 RX ORDER — CELECOXIB 100 MG/1
100 CAPSULE ORAL 2 TIMES DAILY PRN
Qty: 180 CAPSULE | Refills: 0 | Status: SHIPPED | OUTPATIENT
Start: 2024-11-12

## 2025-02-07 RX ORDER — CELECOXIB 100 MG/1
100 CAPSULE ORAL 2 TIMES DAILY PRN
Qty: 180 CAPSULE | Refills: 0 | Status: SHIPPED | OUTPATIENT
Start: 2025-02-07

## 2025-05-12 RX ORDER — CELECOXIB 100 MG/1
100 CAPSULE ORAL 2 TIMES DAILY PRN
Qty: 180 CAPSULE | Refills: 0 | Status: SHIPPED | OUTPATIENT
Start: 2025-05-12

## 2025-05-12 NOTE — TELEPHONE ENCOUNTER
Last OV: 10/10/2024  Next OV: Visit date not found    Next appointment due: 10/10/2025    Last fill: 2/7/2025  Refills: 0

## 2025-06-15 SDOH — ECONOMIC STABILITY: INCOME INSECURITY: IN THE LAST 12 MONTHS, WAS THERE A TIME WHEN YOU WERE NOT ABLE TO PAY THE MORTGAGE OR RENT ON TIME?: NO

## 2025-06-15 SDOH — ECONOMIC STABILITY: FOOD INSECURITY: WITHIN THE PAST 12 MONTHS, YOU WORRIED THAT YOUR FOOD WOULD RUN OUT BEFORE YOU GOT MONEY TO BUY MORE.: NEVER TRUE

## 2025-06-15 SDOH — ECONOMIC STABILITY: TRANSPORTATION INSECURITY
IN THE PAST 12 MONTHS, HAS LACK OF TRANSPORTATION KEPT YOU FROM MEETINGS, WORK, OR FROM GETTING THINGS NEEDED FOR DAILY LIVING?: NO

## 2025-06-15 SDOH — ECONOMIC STABILITY: FOOD INSECURITY: WITHIN THE PAST 12 MONTHS, THE FOOD YOU BOUGHT JUST DIDN'T LAST AND YOU DIDN'T HAVE MONEY TO GET MORE.: NEVER TRUE

## 2025-06-15 SDOH — ECONOMIC STABILITY: TRANSPORTATION INSECURITY
IN THE PAST 12 MONTHS, HAS THE LACK OF TRANSPORTATION KEPT YOU FROM MEDICAL APPOINTMENTS OR FROM GETTING MEDICATIONS?: NO

## 2025-06-15 ASSESSMENT — PATIENT HEALTH QUESTIONNAIRE - PHQ9
2. FEELING DOWN, DEPRESSED OR HOPELESS: NOT AT ALL
1. LITTLE INTEREST OR PLEASURE IN DOING THINGS: NOT AT ALL
1. LITTLE INTEREST OR PLEASURE IN DOING THINGS: NOT AT ALL
2. FEELING DOWN, DEPRESSED OR HOPELESS: NOT AT ALL
SUM OF ALL RESPONSES TO PHQ QUESTIONS 1-9: 0
SUM OF ALL RESPONSES TO PHQ9 QUESTIONS 1 & 2: 0
SUM OF ALL RESPONSES TO PHQ QUESTIONS 1-9: 0

## 2025-06-16 ENCOUNTER — OFFICE VISIT (OUTPATIENT)
Dept: INTERNAL MEDICINE CLINIC | Age: 65
End: 2025-06-16
Payer: COMMERCIAL

## 2025-06-16 VITALS
DIASTOLIC BLOOD PRESSURE: 80 MMHG | SYSTOLIC BLOOD PRESSURE: 120 MMHG | HEIGHT: 65 IN | TEMPERATURE: 97 F | OXYGEN SATURATION: 98 % | WEIGHT: 178 LBS | HEART RATE: 77 BPM | BODY MASS INDEX: 29.66 KG/M2

## 2025-06-16 DIAGNOSIS — E78.2 MIXED HYPERLIPIDEMIA: ICD-10-CM

## 2025-06-16 DIAGNOSIS — R05.3 PERSISTENT COUGH: Primary | ICD-10-CM

## 2025-06-16 PROCEDURE — 99214 OFFICE O/P EST MOD 30 MIN: CPT | Performed by: NURSE PRACTITIONER

## 2025-06-16 PROCEDURE — G2211 COMPLEX E/M VISIT ADD ON: HCPCS | Performed by: NURSE PRACTITIONER

## 2025-06-16 RX ORDER — PRAVASTATIN SODIUM 20 MG
20 TABLET ORAL DAILY
Qty: 90 TABLET | Refills: 1 | Status: SHIPPED | OUTPATIENT
Start: 2025-06-16

## 2025-06-16 RX ORDER — AZITHROMYCIN 250 MG/1
TABLET, FILM COATED ORAL
Qty: 6 TABLET | Refills: 0 | Status: SHIPPED | OUTPATIENT
Start: 2025-06-16 | End: 2025-06-26

## 2025-06-16 RX ORDER — CETIRIZINE HYDROCHLORIDE 10 MG/1
10 TABLET ORAL DAILY
Qty: 30 TABLET | Refills: 3 | Status: SHIPPED | OUTPATIENT
Start: 2025-06-16

## 2025-06-16 RX ORDER — FLUTICASONE PROPIONATE 50 MCG
1 SPRAY, SUSPENSION (ML) NASAL DAILY
Qty: 16 G | Refills: 0 | Status: SHIPPED | OUTPATIENT
Start: 2025-06-16

## 2025-06-16 NOTE — PROGRESS NOTES
6/16/25     Chief Complaint   Patient presents with    Weight Management    Cough     x3mo       History of Present Illness  The patient presents for evaluation of a chronic cough, weight management, and hypercholesterolemia.    Chronic Cough  - The patient reports a cough persisting for approximately 3 months, potentially attributable to exposure to cat litter.  - The cough is severe enough to induce gagging at times but is not continuous.  - There is no associated dyspnea, pyrexia, or chills, and no antecedent respiratory infection.  - Mild sinus drainage and nasal congestion are present, with symptoms remaining stable.  - The patient finds relief with cough drops and has not utilized antihistamines.  - She describes a persistent sensation of a foreign body in the throat but denies odynophagia.  - She has a long-standing allergy affecting the right ear, managed with hydrocortisone.  - The patient ceased smoking in 2009 after a history of smoking half a pack daily.    Weight Management  - The patient is experiencing difficulties with weight management despite adherence to a healthy diet and regular physical activity.  - She expresses concern regarding her BMI and has shown interest in the medication Zepbound.  - Her ability to increase physical activity is limited by knee osteoarthritis.    Hypercholesterolemia  - The patient is not currently on lipid-lowering medication due to previous adverse effects, including myalgia and muscle weakness.    Supplemental information: None    SOCIAL HISTORY  - Quit smoking in 2009 after 30 years of half-pack daily      No Known Allergies    Current Outpatient Medications   Medication Sig Dispense Refill    cetirizine (ZYRTEC) 10 MG tablet Take 1 tablet by mouth daily 30 tablet 3    azithromycin (ZITHROMAX) 250 MG tablet 500mg on day 1 followed by 250mg on days 2 - 5 6 tablet 0    fluticasone (FLONASE) 50 MCG/ACT nasal spray 1 spray by Each Nostril route daily 16 g 0

## 2025-06-16 NOTE — ASSESSMENT & PLAN NOTE
Chronic, uncontrolled.   - Not on medication due to previous muscle aches  - Prescribe different cholesterol medication for better tolerance   Orders:    tirzepatide-weight management (ZEPBOUND) 2.5 MG/0.5ML SOAJ subCUTAneous auto-injector pen; Inject 2.5 mg into the skin every 7 days    pravastatin (PRAVACHOL) 20 MG tablet; Take 1 tablet by mouth daily

## 2025-08-07 DIAGNOSIS — R05.3 PERSISTENT COUGH: ICD-10-CM

## 2025-08-07 RX ORDER — FLUTICASONE PROPIONATE 50 MCG
SPRAY, SUSPENSION (ML) NASAL
Qty: 24 ML | Refills: 1 | Status: SHIPPED | OUTPATIENT
Start: 2025-08-07

## 2025-08-18 RX ORDER — CELECOXIB 100 MG/1
100 CAPSULE ORAL 2 TIMES DAILY PRN
Qty: 180 CAPSULE | Refills: 0 | Status: SHIPPED | OUTPATIENT
Start: 2025-08-18